# Patient Record
Sex: MALE | Race: OTHER | Employment: FULL TIME | URBAN - METROPOLITAN AREA
[De-identification: names, ages, dates, MRNs, and addresses within clinical notes are randomized per-mention and may not be internally consistent; named-entity substitution may affect disease eponyms.]

---

## 2019-01-07 ENCOUNTER — TELEPHONE (OUTPATIENT)
Dept: FAMILY MEDICINE CLINIC | Age: 44
End: 2019-01-07

## 2019-01-07 ENCOUNTER — OFFICE VISIT (OUTPATIENT)
Dept: FAMILY MEDICINE CLINIC | Age: 44
End: 2019-01-07

## 2019-01-07 VITALS
RESPIRATION RATE: 16 BRPM | SYSTOLIC BLOOD PRESSURE: 129 MMHG | HEART RATE: 73 BPM | HEIGHT: 67 IN | OXYGEN SATURATION: 97 % | DIASTOLIC BLOOD PRESSURE: 82 MMHG | TEMPERATURE: 96.7 F | BODY MASS INDEX: 31.08 KG/M2 | WEIGHT: 198 LBS

## 2019-01-07 DIAGNOSIS — R10.11 RIGHT UPPER QUADRANT ABDOMINAL PAIN: Primary | ICD-10-CM

## 2019-01-07 DIAGNOSIS — K76.0 FATTY LIVER: ICD-10-CM

## 2019-01-07 PROBLEM — K29.30 CHRONIC SUPERFICIAL GASTRITIS WITHOUT BLEEDING: Status: ACTIVE | Noted: 2019-01-07

## 2019-01-07 LAB
BILIRUB UR QL STRIP: NEGATIVE
GLUCOSE UR-MCNC: NEGATIVE MG/DL
KETONES P FAST UR STRIP-MCNC: NEGATIVE MG/DL
PH UR STRIP: 5.5 [PH] (ref 4.6–8)
PROT UR QL STRIP: NEGATIVE
SP GR UR STRIP: 1.02 (ref 1–1.03)
UA UROBILINOGEN AMB POC: NORMAL (ref 0.2–1)
URINALYSIS CLARITY POC: CLEAR
URINALYSIS COLOR POC: YELLOW
URINE BLOOD POC: NORMAL
URINE LEUKOCYTES POC: NEGATIVE
URINE NITRITES POC: NEGATIVE

## 2019-01-07 NOTE — PROGRESS NOTES
Mirtha Mathur is a 39 y.o. male (: 1975) presenting to address:abdominal burning x3 weeks     Chief Complaint   Patient presents with    Abdominal Pain     burning sensation x3 weeks       Vitals:    19 1029   BP: 129/82   Pulse: 73   Resp: 16   Temp: 96.7 °F (35.9 °C)   TempSrc: Oral   SpO2: 97%   Weight: 198 lb (89.8 kg)   Height: 5' 7\" (1.702 m)   PainSc:   5   PainLoc: Abdomen       Hearing/Vision:   No exam data present    Learning Assessment:   No flowsheet data found. Depression Screening:     PHQ over the last two weeks 2019   Little interest or pleasure in doing things Not at all   Feeling down, depressed, irritable, or hopeless Not at all   Total Score PHQ 2 0     Fall Risk Assessment:   No flowsheet data found. Abuse Screening:   No flowsheet data found. Coordination of Care Questionaire:   1. Have you been to the ER, urgent care clinic since your last visit? Hospitalized since your last visit? no    2. Have you seen or consulted any other health care providers outside of the [address] [place] [address] since your last visit? Include any pap smears or colon screening. no    Advanced Directive:   1. Do you have an Advanced Directive? no    2. Would you like information on Advanced Directives? No    Patient declined flu vaccine.

## 2019-01-07 NOTE — PATIENT INSTRUCTIONS
Nonalcoholic Steatohepatitis (MEYER): Care Instructions  Your Care Instructions    Nonalcoholic steatohepatitis (MEYER) is liver inflammation. It is caused by a buildup of fat in the liver. The fat buildup is not caused by drinking alcohol. Because of the inflammation, the liver does not work as well as it should. MEYER is part of a group of liver diseases called nonalcoholic fatty liver disease. In these diseases, fat builds up in the liver and sometimes causes liver damage. This damage can get worse over time. Follow-up care is a key part of your treatment and safety. Be sure to make and go to all appointments, and call your doctor if you are having problems. It's also a good idea to know your test results and keep a list of the medicines you take. How can you care for yourself at home? · Stay at a healthy weight. Or if you need to, slowly get to a healthy weight. · Control your cholesterol. Talk to your doctor about ways to lower your cholesterol, if needed. You might try getting active, taking medicines, and making healthy changes to your diet. · Eat healthy foods. This includes fruits, vegetables, lean meats and dairy, and whole grains. · If you have diabetes, keep your blood sugar at your target level. · Get at least 30 minutes of exercise on most days of the week. Walking is a good choice. You also may want to do other activities, such as running, swimming, cycling, or playing tennis or team sports. · Limit alcohol, or do not drink. Alcohol can damage the liver and cause health problems. When should you call for help? Call 911 anytime you think you may need emergency care.  For example, call if:    · You have trouble breathing.     · You vomit blood or what looks like coffee grounds.    Call your doctor now or seek immediate medical care if:    · You feel very sleepy or confused.     · You have new or worse belly pain.     · You have a fever.     · There is a new or increasing yellow tint to your skin or the whites of your eyes.     · You have any abnormal bleeding, such as:  ? Nosebleeds. ? Vaginal bleeding that is different (heavier, more frequent, at a different time of the month) than what you are used to.  ? Bloody or black stools, or rectal bleeding. ? Bloody or pink urine.    Watch closely for changes in your health, and be sure to contact your doctor if:    · Your belly is getting bigger.     · You are gaining weight.     · You have any problems. Where can you learn more? Go to http://wade-tania.info/. Enter R660 in the search box to learn more about \"Nonalcoholic Steatohepatitis (MEYER): Care Instructions. \"  Current as of: March 28, 2018  Content Version: 11.8  © 9703-3735 [employer]wise, Incorporated. Care instructions adapted under license by [employer] (which disclaims liability or warranty for this information). If you have questions about a medical condition or this instruction, always ask your healthcare professional. [address] any warranty or liability for your use of this information.

## 2019-01-07 NOTE — PROGRESS NOTES
Phil Maldonado is a 39 y.o.  male and presents with    Chief Complaint   Patient presents with    Abdominal Pain     burning sensation x3 weeks         Subjective:  Patient presents for continued RUQ pain ongoing for > 6 months. Was seen by GI in past and was told no identified gall issues. Does have pain rates 5/10 all the time. He states pain is in RUQ only. No n/v/d/c identified. Not worse after eating. Previous ultrasound only showed fatty liver no c/o gallbladder. No Known Allergies    Review of Systems   Gastrointestinal: Positive for abdominal pain. All other systems reviewed and are negative. Objective:  Vitals:    01/07/19 1029   BP: 129/82   Pulse: 73   Resp: 16   Temp: 96.7 °F (35.9 °C)   TempSrc: Oral   SpO2: 97%   Weight: 198 lb (89.8 kg)   Height: 5' 7\" (1.702 m)   PainSc:   5   PainLoc: Abdomen     General:   Well-groomed, well-nourished, in no distress, pleasant, alert, appropriate    Cardiovasc:   No JVD. RRR,  no murmur, rubs or gallops. Pulmonary:    Lungs clear bilaterally, no wheezing, rales or rhonchi. Abdomen:   Abdomen soft, normal bowel sounds. No masses,  rebound/rigidity or CVA tenderness. No hepatosplenomegaly. + TTP over RUQ, + marcus. Assessment/Plan:      1. Right upper quadrant abdominal pain  - AMB POC URINALYSIS DIP STICK AUTO W/O MICRO  - US ABD COMP; Future  - METABOLIC PANEL, BASIC; Future  - HEPATIC FUNCTION PANEL; Future  - LIPID PANEL; Future  - CBC W/O DIFF; Future    2. Fatty liver  - METABOLIC PANEL, BASIC; Future  - HEPATIC FUNCTION PANEL; Future  - LIPID PANEL; Future  - CBC W/O DIFF; Future    Will call patient with lab results and ultrasound results. Discussed diet and alcohol use in detail. Also discussed weight loss. Plan is to see back in 3 months. I have discussed the diagnosis with the patient and the intended plan as seen in the above orders.   The patient has received an after-visit summary and questions were answered concerning future plans. I have discussed medication side effects and warnings with the patient as well. I have reviewed the plan of care with the patient, accepted their input and they are in agreement with the treatment goals. Follow-up Disposition:  Return if symptoms worsen or fail to improve. More than 1/2 of this 30 minute visit was spent in counselling and coordination of care, as described above.     Ana Morton FNP-BC

## 2019-01-10 ENCOUNTER — HOSPITAL ENCOUNTER (OUTPATIENT)
Dept: LAB | Age: 44
Discharge: HOME | End: 2019-01-10
Payer: COMMERCIAL

## 2019-01-10 DIAGNOSIS — K76.0 FATTY LIVER: ICD-10-CM

## 2019-01-10 DIAGNOSIS — R10.11 RIGHT UPPER QUADRANT ABDOMINAL PAIN: ICD-10-CM

## 2019-01-10 LAB
ALBUMIN SERPL-MCNC: 4.3 G/DL (ref 3.4–5)
ALBUMIN/GLOB SERPL: 1.3 {RATIO} (ref 0.8–1.7)
ALP SERPL-CCNC: 68 U/L (ref 45–117)
ALT SERPL-CCNC: 50 U/L (ref 16–61)
ANION GAP SERPL CALC-SCNC: 4 MMOL/L (ref 3–18)
AST SERPL-CCNC: 21 U/L (ref 15–37)
BILIRUB DIRECT SERPL-MCNC: <0.1 MG/DL (ref 0–0.2)
BILIRUB SERPL-MCNC: 0.4 MG/DL (ref 0.2–1)
BUN SERPL-MCNC: 19 MG/DL (ref 7–18)
BUN/CREAT SERPL: 22 (ref 12–20)
CALCIUM SERPL-MCNC: 8.6 MG/DL (ref 8.5–10.1)
CHLORIDE SERPL-SCNC: 103 MMOL/L (ref 100–108)
CHOLEST SERPL-MCNC: 220 MG/DL
CO2 SERPL-SCNC: 32 MMOL/L (ref 21–32)
CREAT SERPL-MCNC: 0.85 MG/DL (ref 0.6–1.3)
ERYTHROCYTE [DISTWIDTH] IN BLOOD BY AUTOMATED COUNT: 13.1 % (ref 11.6–14.5)
GLOBULIN SER CALC-MCNC: 3.2 G/DL (ref 2–4)
GLUCOSE SERPL-MCNC: 104 MG/DL (ref 74–99)
HCT VFR BLD AUTO: 45.5 % (ref 36–48)
HDLC SERPL-MCNC: 55 MG/DL (ref 40–60)
HDLC SERPL: 4 {RATIO} (ref 0–5)
HGB BLD-MCNC: 14.8 G/DL (ref 13–16)
LDLC SERPL CALC-MCNC: 139.2 MG/DL (ref 0–100)
LIPID PROFILE,FLP: ABNORMAL
MCH RBC QN AUTO: 28.5 PG (ref 24–34)
MCHC RBC AUTO-ENTMCNC: 32.5 G/DL (ref 31–37)
MCV RBC AUTO: 87.7 FL (ref 74–97)
PLATELET # BLD AUTO: 296 K/UL (ref 135–420)
PMV BLD AUTO: 10.3 FL (ref 9.2–11.8)
POTASSIUM SERPL-SCNC: 4.2 MMOL/L (ref 3.5–5.5)
PROT SERPL-MCNC: 7.5 G/DL (ref 6.4–8.2)
RBC # BLD AUTO: 5.19 M/UL (ref 4.7–5.5)
SODIUM SERPL-SCNC: 139 MMOL/L (ref 136–145)
TRIGL SERPL-MCNC: 129 MG/DL (ref ?–150)
VLDLC SERPL CALC-MCNC: 25.8 MG/DL
WBC # BLD AUTO: 6.8 K/UL (ref 4.6–13.2)

## 2019-01-10 PROCEDURE — 80076 HEPATIC FUNCTION PANEL: CPT

## 2019-01-10 PROCEDURE — 36415 COLL VENOUS BLD VENIPUNCTURE: CPT

## 2019-01-10 PROCEDURE — 80048 BASIC METABOLIC PNL TOTAL CA: CPT

## 2019-01-10 PROCEDURE — 80061 LIPID PANEL: CPT

## 2019-01-10 PROCEDURE — 85027 COMPLETE CBC AUTOMATED: CPT

## 2019-02-25 ENCOUNTER — TELEPHONE (OUTPATIENT)
Dept: FAMILY MEDICINE CLINIC | Age: 44
End: 2019-02-25

## 2019-02-25 NOTE — TELEPHONE ENCOUNTER
Pt is calling requesting a written result on his labs from 01/10/2019, he has been calling waiting on results and still haven't received them.

## 2019-03-08 ENCOUNTER — TELEPHONE (OUTPATIENT)
Dept: FAMILY MEDICINE CLINIC | Age: 44
End: 2019-03-08

## 2019-03-18 ENCOUNTER — TELEPHONE (OUTPATIENT)
Dept: FAMILY MEDICINE CLINIC | Age: 44
End: 2019-03-18

## 2019-03-18 NOTE — LETTER
3/19/2019 7:45 AM 
 
Mr. Zehra Foss 
[address] 
[address] Results for orders placed or performed during the hospital encounter of 01/10/19 METABOLIC PANEL, BASIC Result Value Ref Range Sodium 139 136 - 145 mmol/L Potassium 4.2 3.5 - 5.5 mmol/L Chloride 103 100 - 108 mmol/L  
 CO2 32 21 - 32 mmol/L Anion gap 4 3.0 - 18 mmol/L Glucose 104 (H) 74 - 99 mg/dL BUN 19 (H) 7.0 - 18 MG/DL Creatinine 0.85 0.6 - 1.3 MG/DL  
 BUN/Creatinine ratio 22 (H) 12 - 20 GFR est AA >60 >60 ml/min/1.73m2 GFR est non-AA >60 >60 ml/min/1.73m2 Calcium 8.6 8.5 - 10.1 MG/DL  
HEPATIC FUNCTION PANEL Result Value Ref Range Protein, total 7.5 6.4 - 8.2 g/dL Albumin 4.3 3.4 - 5.0 g/dL Globulin 3.2 2.0 - 4.0 g/dL A-G Ratio 1.3 0.8 - 1.7 Bilirubin, total 0.4 0.2 - 1.0 MG/DL Bilirubin, direct <0.1 0.0 - 0.2 MG/DL Alk. phosphatase 68 45 - 117 U/L  
 AST (SGOT) 21 15 - 37 U/L  
 ALT (SGPT) 50 16 - 61 U/L  
LIPID PANEL Result Value Ref Range LIPID PROFILE Cholesterol, total 220 (H) <200 MG/DL Triglyceride 129 <150 MG/DL  
 HDL Cholesterol 55 40 - 60 MG/DL  
 LDL, calculated 139.2 (H) 0 - 100 MG/DL VLDL, calculated 25.8 MG/DL  
 CHOL/HDL Ratio 4.0 0 - 5.0    
CBC W/O DIFF Result Value Ref Range WBC 6.8 4.6 - 13.2 K/uL  
 RBC 5.19 4.70 - 5.50 M/uL  
 HGB 14.8 13.0 - 16.0 g/dL HCT 45.5 36.0 - 48.0 % MCV 87.7 74.0 - 97.0 FL  
 MCH 28.5 24.0 - 34.0 PG  
 MCHC 32.5 31.0 - 37.0 g/dL  
 RDW 13.1 11.6 - 14.5 % PLATELET 783 437 - 960 K/uL  MPV 10.3 9.2 - 11.8 FL

## 2019-05-01 ENCOUNTER — OFFICE VISIT (OUTPATIENT)
Dept: FAMILY MEDICINE CLINIC | Age: 44
End: 2019-05-01

## 2019-05-01 VITALS
HEART RATE: 85 BPM | OXYGEN SATURATION: 98 % | RESPIRATION RATE: 20 BRPM | TEMPERATURE: 97.7 F | SYSTOLIC BLOOD PRESSURE: 121 MMHG | WEIGHT: 192 LBS | HEIGHT: 67 IN | DIASTOLIC BLOOD PRESSURE: 87 MMHG | BODY MASS INDEX: 30.13 KG/M2

## 2019-05-01 DIAGNOSIS — K76.9 LESION OF LIVER: Primary | ICD-10-CM

## 2019-05-01 DIAGNOSIS — K76.0 FATTY LIVER: ICD-10-CM

## 2019-05-01 DIAGNOSIS — F32.1 CURRENT MODERATE EPISODE OF MAJOR DEPRESSIVE DISORDER WITHOUT PRIOR EPISODE (HCC): ICD-10-CM

## 2019-05-01 NOTE — PATIENT INSTRUCTIONS
Nonalcoholic Steatohepatitis (MEYER): Care Instructions  Your Care Instructions    Nonalcoholic steatohepatitis (MEYER) is liver inflammation. It is caused by a buildup of fat in the liver. The fat buildup is not caused by drinking alcohol. Because of the inflammation, the liver does not work as well as it should. MEYER is part of a group of liver diseases called nonalcoholic fatty liver disease. In these diseases, fat builds up in the liver and sometimes causes liver damage. This damage can get worse over time. Follow-up care is a key part of your treatment and safety. Be sure to make and go to all appointments, and call your doctor if you are having problems. It's also a good idea to know your test results and keep a list of the medicines you take. How can you care for yourself at home? · Stay at a healthy weight. Or if you need to, slowly get to a healthy weight. · Control your cholesterol. Talk to your doctor about ways to lower your cholesterol, if needed. You might try getting active, taking medicines, and making healthy changes to your diet. · Eat healthy foods. This includes fruits, vegetables, lean meats and dairy, and whole grains. · If you have diabetes, keep your blood sugar at your target level. · Get at least 30 minutes of exercise on most days of the week. Walking is a good choice. You also may want to do other activities, such as running, swimming, cycling, or playing tennis or team sports. · Limit alcohol, or do not drink. Alcohol can damage the liver and cause health problems. When should you call for help? Call 911 anytime you think you may need emergency care.  For example, call if:    · You have trouble breathing.     · You vomit blood or what looks like coffee grounds.    Call your doctor now or seek immediate medical care if:    · You feel very sleepy or confused.     · You have new or worse belly pain.     · You have a fever.     · There is a new or increasing yellow tint to your skin or the whites of your eyes.     · You have any abnormal bleeding, such as:  ? Nosebleeds. ? Vaginal bleeding that is different (heavier, more frequent, at a different time of the month) than what you are used to.  ? Bloody or black stools, or rectal bleeding. ? Bloody or pink urine.    Watch closely for changes in your health, and be sure to contact your doctor if:    · Your belly is getting bigger.     · You are gaining weight.     · You have any problems. Where can you learn more? Go to http://wade-tania.info/. Enter S719 in the search box to learn more about \"Nonalcoholic Steatohepatitis (MEYER): Care Instructions. \"  Current as of: March 27, 2018  Content Version: 11.9  © 2713-7697 [employer]. Care instructions adapted under license by [employer] (which disclaims liability or warranty for this information). If you have questions about a medical condition or this instruction, always ask your healthcare professional. [address] any warranty or liability for your use of this information.

## 2019-05-01 NOTE — PROGRESS NOTES
Moody Omalley is a 38 y.o. male (: 1975) presenting to address:    Chief Complaint   Patient presents with   [facility] Follow Up       Vitals:    19 1353   BP: 121/87   Pulse: 85   Resp: 20   Temp: 97.7 °F (36.5 °C)   TempSrc: Oral   SpO2: 98%   Weight: 192 lb (87.1 kg)   Height: 5' 7\" (1.702 m)   PainSc:   2   PainLoc: Abdomen       Hearing/Vision:   No exam data present    Learning Assessment:   No flowsheet data found. Depression Screening:     3 most recent PHQ Screens 2019   Little interest or pleasure in doing things Several days   Feeling down, depressed, irritable, or hopeless Several days   Total Score PHQ 2 2     Fall Risk Assessment:   No flowsheet data found. Abuse Screening:   No flowsheet data found. Coordination of Care Questionaire:   1. Have you been to the ER, urgent care clinic since your last visit? Hospitalized since your last visit? YES demond    2. Have you seen or consulted any other health care providers outside of the [address] [place] [address] since your last visit? Include any pap smears or colon screening. NO    Advanced Directive:   1. Do you have an Advanced Directive? NO    2. Would you like information on Advanced Directives?  NO

## 2019-05-02 NOTE — PROGRESS NOTES
Terence Laughlin is a 38 y.o.  male and presents with    Chief Complaint   Patient presents with   [facility] Follow Up     Subjective:  Patient presents for hospital follow up as new patient. He states he was seen in ED for abdominal pain, nausea and vomiting x 1 day ago. At that time he was dx with lexis liver, liver lesions, and alcoholic gastroparesis. He was given zofran and told to stop drinking. Today he is feeling improved he did not fill zofran n/v has stopped. Denies abdominal pain today as well. On review of records it does show Ultrasound identified 2 1 cm lesions on liver and recommendation for follow up MRI was made by Radiology. Patient also discussed recent change in finances and increased feelings of stress and depressed mood x 3 months. No Known Allergies    Review of Systems   Constitutional: Negative for chills and fever. Respiratory: Negative for shortness of breath. Cardiovascular: Negative for chest pain. Gastrointestinal: Negative for abdominal pain, constipation, diarrhea, nausea and vomiting. Objective:  Vitals:    05/01/19 1353   BP: 121/87   Pulse: 85   Resp: 20   Temp: 97.7 °F (36.5 °C)   TempSrc: Oral   SpO2: 98%   Weight: 192 lb (87.1 kg)   Height: 5' 7\" (1.702 m)   PainSc:   2   PainLoc: Abdomen     General:   Well-groomed, well-nourished, in no distress, pleasant, alert, appropriate    Cardiovasc:   RRR,  no murmur, rubs or gallops. Pulmonary:    Lungs clear bilaterally, no wheezing, rales or rhonchi. Abdomen:   Abdomen soft, normal bowel sounds. No masses, tenderness,    rebound/rigidity or CVA tenderness. No hepatosplenomegaly. Assessment/Plan:      1. Lesion of liver  - MRI ABD W MRCP W WO CONT; Future    2. Fatty liver  Discussed diet and alcohol use to start. Gvae handout on fatty liver causes and tx with healthy lifestyle. Follow up as needed and when MRI results are available for review. - MRI ABD W MRCP W WO CONT; Future    3.  Depression Reviewed PHQ9 and he declines meds for now but will follow up in 3 months to see how he is doing. Gave suicide hotline, advised ED if unstable and recommended therapy. I have discussed the diagnosis with the patient and the intended plan as seen in the above orders. The patient has received an after-visit summary and questions were answered concerning future plans. I have discussed medication side effects and warnings with the patient as well. I have reviewed the plan of care with the patient, accepted their input and they are in agreement with the treatment goals. Follow-up and Dispositions    · Return in about 3 months (around 8/1/2019). More than 1/2 of this 30 minute visit was spent in counselling and coordination of care, as described above.     Martir ARAIZA-BC

## 2019-05-03 ENCOUNTER — DOCUMENTATION ONLY (OUTPATIENT)
Dept: FAMILY MEDICINE CLINIC | Age: 44
End: 2019-05-03

## 2019-05-03 NOTE — PROGRESS NOTES
Faxed copy of ultrasounds with MRI order, ov notes to [facility]. Asked that they auth MRI and schedule pt.

## 2019-05-15 ENCOUNTER — HOSPITAL ENCOUNTER (OUTPATIENT)
Dept: MRI IMAGING | Age: 44
Discharge: HOME | End: 2019-05-15
Attending: NURSE PRACTITIONER
Payer: COMMERCIAL

## 2019-05-15 VITALS — BODY MASS INDEX: 30.07 KG/M2 | WEIGHT: 192 LBS

## 2019-05-15 DIAGNOSIS — K76.0 FATTY LIVER: ICD-10-CM

## 2019-05-15 DIAGNOSIS — K76.9 LESION OF LIVER: ICD-10-CM

## 2019-05-15 PROCEDURE — A9575 INJ GADOTERATE MEGLUMI 0.1ML: HCPCS | Performed by: NURSE PRACTITIONER

## 2019-05-15 PROCEDURE — 74183 MRI ABD W/O CNTR FLWD CNTR: CPT

## 2019-05-15 PROCEDURE — 74011250636 HC RX REV CODE- 250/636: Performed by: NURSE PRACTITIONER

## 2019-05-15 RX ORDER — GADOTERATE MEGLUMINE 376.9 MG/ML
20 INJECTION INTRAVENOUS
Status: COMPLETED | OUTPATIENT
Start: 2019-05-15 | End: 2019-05-15

## 2019-05-15 RX ADMIN — GADOTERATE MEGLUMINE 20 ML: 376.9 INJECTION INTRAVENOUS at 12:54

## 2019-05-30 NOTE — PROGRESS NOTES
Spoke with patient and gave resulted note  Transferred patient to front office to schedule 6 month follow up.

## 2020-02-14 PROBLEM — K76.0 FATTY LIVER: Status: ACTIVE | Noted: 2020-02-14

## 2021-11-08 ENCOUNTER — TELEPHONE (OUTPATIENT)
Dept: FAMILY MEDICINE CLINIC | Age: 46
End: 2021-11-08

## 2021-11-08 DIAGNOSIS — Z13.21 ENCOUNTER FOR VITAMIN DEFICIENCY SCREENING: ICD-10-CM

## 2021-11-08 DIAGNOSIS — K76.0 FATTY LIVER: Primary | ICD-10-CM

## 2021-11-08 DIAGNOSIS — Z13.29 SCREENING FOR THYROID DISORDER: ICD-10-CM

## 2021-11-08 DIAGNOSIS — K29.30 CHRONIC SUPERFICIAL GASTRITIS WITHOUT BLEEDING: ICD-10-CM

## 2021-11-08 DIAGNOSIS — Z11.59 NEED FOR HEPATITIS C SCREENING TEST: ICD-10-CM

## 2021-11-08 DIAGNOSIS — Z13.1 SCREENING FOR DIABETES MELLITUS: ICD-10-CM

## 2021-11-08 DIAGNOSIS — E78.5 DYSLIPIDEMIA, GOAL LDL BELOW 100: ICD-10-CM

## 2021-11-08 NOTE — TELEPHONE ENCOUNTER
Please place orders for upcoming appointments.      Future Appointments   Date Time Provider Ranjith Green   11/12/2021  8:50 AM [facility] AMB   11/19/2021 11:30 AM Lindsay Cerda DO [facility] AMB

## 2021-11-12 ENCOUNTER — APPOINTMENT (OUTPATIENT)
Dept: FAMILY MEDICINE CLINIC | Age: 46
End: 2021-11-12

## 2021-11-12 DIAGNOSIS — Z13.1 SCREENING FOR DIABETES MELLITUS: ICD-10-CM

## 2021-11-12 DIAGNOSIS — Z13.21 ENCOUNTER FOR VITAMIN DEFICIENCY SCREENING: ICD-10-CM

## 2021-11-12 DIAGNOSIS — K29.30 CHRONIC SUPERFICIAL GASTRITIS WITHOUT BLEEDING: ICD-10-CM

## 2021-11-12 DIAGNOSIS — Z11.59 NEED FOR HEPATITIS C SCREENING TEST: ICD-10-CM

## 2021-11-12 DIAGNOSIS — K76.0 FATTY LIVER: ICD-10-CM

## 2021-11-12 DIAGNOSIS — Z13.29 SCREENING FOR THYROID DISORDER: ICD-10-CM

## 2021-11-12 DIAGNOSIS — E78.5 DYSLIPIDEMIA, GOAL LDL BELOW 100: ICD-10-CM

## 2021-11-13 LAB
25(OH)D3+25(OH)D2 SERPL-MCNC: 22.1 NG/ML (ref 30–100)
ALBUMIN SERPL-MCNC: 4.7 G/DL (ref 4–5)
ALBUMIN/GLOB SERPL: 2.1 {RATIO} (ref 1.2–2.2)
ALP SERPL-CCNC: 58 IU/L (ref 44–121)
ALT SERPL-CCNC: 28 IU/L (ref 0–44)
AST SERPL-CCNC: 24 IU/L (ref 0–40)
BASOPHILS # BLD AUTO: 0.1 X10E3/UL (ref 0–0.2)
BASOPHILS NFR BLD AUTO: 1 %
BILIRUB SERPL-MCNC: 0.3 MG/DL (ref 0–1.2)
BUN SERPL-MCNC: 12 MG/DL (ref 6–24)
BUN/CREAT SERPL: 14 (ref 9–20)
CALCIUM SERPL-MCNC: 9 MG/DL (ref 8.7–10.2)
CHLORIDE SERPL-SCNC: 102 MMOL/L (ref 96–106)
CHOLEST SERPL-MCNC: 202 MG/DL (ref 100–199)
CO2 SERPL-SCNC: 24 MMOL/L (ref 20–29)
CREAT SERPL-MCNC: 0.85 MG/DL (ref 0.76–1.27)
EOSINOPHIL # BLD AUTO: 0.1 X10E3/UL (ref 0–0.4)
EOSINOPHIL NFR BLD AUTO: 1 %
ERYTHROCYTE [DISTWIDTH] IN BLOOD BY AUTOMATED COUNT: 13.1 % (ref 11.6–15.4)
EST. AVERAGE GLUCOSE BLD GHB EST-MCNC: 128 MG/DL
GLOBULIN SER CALC-MCNC: 2.2 G/DL (ref 1.5–4.5)
GLUCOSE SERPL-MCNC: 97 MG/DL (ref 65–99)
HBA1C MFR BLD: 6.1 % (ref 4.8–5.6)
HCT VFR BLD AUTO: 42.9 % (ref 37.5–51)
HCV AB S/CO SERPL IA: <0.1 S/CO RATIO (ref 0–0.9)
HDLC SERPL-MCNC: 54 MG/DL
HGB BLD-MCNC: 14.6 G/DL (ref 13–17.7)
IMM GRANULOCYTES # BLD AUTO: 0 X10E3/UL (ref 0–0.1)
IMM GRANULOCYTES NFR BLD AUTO: 0 %
IMP & REVIEW OF LAB RESULTS: NORMAL
LDLC SERPL CALC-MCNC: 131 MG/DL (ref 0–99)
LYMPHOCYTES # BLD AUTO: 1.8 X10E3/UL (ref 0.7–3.1)
LYMPHOCYTES NFR BLD AUTO: 34 %
MCH RBC QN AUTO: 29.4 PG (ref 26.6–33)
MCHC RBC AUTO-ENTMCNC: 34 G/DL (ref 31.5–35.7)
MCV RBC AUTO: 87 FL (ref 79–97)
MONOCYTES # BLD AUTO: 0.4 X10E3/UL (ref 0.1–0.9)
MONOCYTES NFR BLD AUTO: 7 %
NEUTROPHILS # BLD AUTO: 3 X10E3/UL (ref 1.4–7)
NEUTROPHILS NFR BLD AUTO: 57 %
PLATELET # BLD AUTO: 278 X10E3/UL (ref 150–450)
POTASSIUM SERPL-SCNC: 4 MMOL/L (ref 3.5–5.2)
PROT SERPL-MCNC: 6.9 G/DL (ref 6–8.5)
RBC # BLD AUTO: 4.96 X10E6/UL (ref 4.14–5.8)
SODIUM SERPL-SCNC: 141 MMOL/L (ref 134–144)
T4 FREE SERPL-MCNC: 1.19 NG/DL (ref 0.82–1.77)
TRIGL SERPL-MCNC: 95 MG/DL (ref 0–149)
TSH SERPL DL<=0.005 MIU/L-ACNC: 0.43 UIU/ML (ref 0.45–4.5)
VLDLC SERPL CALC-MCNC: 17 MG/DL (ref 5–40)
WBC # BLD AUTO: 5.3 X10E3/UL (ref 3.4–10.8)

## 2021-11-15 NOTE — PROGRESS NOTES
Will review in detail at follow up 11/19:     Vitamin D low and would offer supplement. LDL slightly elevated, stable from 2yrs ago and ASCVD remains low at 2.1%, optimal 1.5%. TSH slightly decreased but T4 WNL. A1C near diabetes range and could consider metformin, no prior values to compare with.

## 2021-11-19 ENCOUNTER — OFFICE VISIT (OUTPATIENT)
Dept: FAMILY MEDICINE CLINIC | Age: 46
End: 2021-11-19
Payer: COMMERCIAL

## 2021-11-19 VITALS
HEIGHT: 67 IN | WEIGHT: 197.6 LBS | DIASTOLIC BLOOD PRESSURE: 76 MMHG | OXYGEN SATURATION: 99 % | HEART RATE: 89 BPM | RESPIRATION RATE: 16 BRPM | TEMPERATURE: 97.2 F | SYSTOLIC BLOOD PRESSURE: 115 MMHG | BODY MASS INDEX: 31.01 KG/M2

## 2021-11-19 DIAGNOSIS — Z78.9 USES SPANISH AS PRIMARY SPOKEN LANGUAGE: ICD-10-CM

## 2021-11-19 DIAGNOSIS — E55.9 VITAMIN D DEFICIENCY: ICD-10-CM

## 2021-11-19 DIAGNOSIS — R79.89 LOW TSH LEVEL: ICD-10-CM

## 2021-11-19 DIAGNOSIS — E05.90 SUBCLINICAL HYPERTHYROIDISM: ICD-10-CM

## 2021-11-19 DIAGNOSIS — Z23 NEEDS FLU SHOT: ICD-10-CM

## 2021-11-19 DIAGNOSIS — K76.89 LIVER NODULE: ICD-10-CM

## 2021-11-19 DIAGNOSIS — R73.03 PREDIABETES: Primary | ICD-10-CM

## 2021-11-19 DIAGNOSIS — Z76.89 ESTABLISHING CARE WITH NEW DOCTOR, ENCOUNTER FOR: ICD-10-CM

## 2021-11-19 DIAGNOSIS — E78.5 DYSLIPIDEMIA: ICD-10-CM

## 2021-11-19 PROCEDURE — 90686 IIV4 VACC NO PRSV 0.5 ML IM: CPT | Performed by: STUDENT IN AN ORGANIZED HEALTH CARE EDUCATION/TRAINING PROGRAM

## 2021-11-19 PROCEDURE — 99214 OFFICE O/P EST MOD 30 MIN: CPT | Performed by: STUDENT IN AN ORGANIZED HEALTH CARE EDUCATION/TRAINING PROGRAM

## 2021-11-19 PROCEDURE — 90471 IMMUNIZATION ADMIN: CPT | Performed by: STUDENT IN AN ORGANIZED HEALTH CARE EDUCATION/TRAINING PROGRAM

## 2021-11-19 RX ORDER — METFORMIN HYDROCHLORIDE 500 MG/1
500 TABLET ORAL
Qty: 90 TABLET | Refills: 1 | Status: SHIPPED | OUTPATIENT
Start: 2021-11-19

## 2021-11-19 NOTE — PATIENT INSTRUCTIONS
Vaccine Information Statement    Influenza (Flu) Vaccine (Inactivated or Recombinant): What You Need to Know    Many vaccine information statements are available in Korean and other languages. See www.immunize.org/vis. Hojas de información sobre vacunas están disponibles en español y en muchos otros idiomas. Visite www.immunize.org/vis. 1. Why get vaccinated? Influenza vaccine can prevent influenza (flu). Flu is a contagious disease that spreads around the [facility] every year, usually between October and May. Anyone can get the flu, but it is more dangerous for some people. Infants and young children, people 72 years and older, pregnant people, and people with certain health conditions or a weakened immune system are at greatest risk of flu complications. Pneumonia, bronchitis, sinus infections, and ear infections are examples of flu-related complications. If you have a medical condition, such as heart disease, cancer, or diabetes, flu can make it worse. Flu can cause fever and chills, sore throat, muscle aches, fatigue, cough, headache, and runny or stuffy nose. Some people may have vomiting and diarrhea, though this is more common in children than adults. In an average year, thousands of people in the [facility] die from flu, and many more are hospitalized. Flu vaccine prevents millions of illnesses and flu-related visits to the doctor each year. 2. Influenza vaccines     CDC recommends everyone 6 months and older get vaccinated every flu season. Children 6 months through 6years of age may need 2 doses during a single flu season. Everyone else needs only 1 dose each flu season. It takes about 2 weeks for protection to develop after vaccination. There are many flu viruses, and they are always changing. Each year a new flu vaccine is made to protect against the influenza viruses believed to be likely to cause disease in the upcoming flu season.  Even when the vaccine doesnt exactly match these viruses, it may still provide some protection. Influenza vaccine does not cause flu. Influenza vaccine may be given at the same time as other vaccines. 3. Talk with your health care provider    Tell your vaccination provider if the person getting the vaccine:   Has had an allergic reaction after a previous dose of influenza vaccine, or has any severe, life-threatening allergies    Has ever had Guillain-Barré Syndrome (also called GBS)    In some cases, your health care provider may decide to postpone influenza vaccination until a future visit. Influenza vaccine can be administered at any time during pregnancy. People who are or will be pregnant during influenza season should receive inactivated influenza vaccine. People with minor illnesses, such as a cold, may be vaccinated. People who are moderately or severely ill should usually wait until they recover before getting influenza vaccine. Your health care provider can give you more information. 4. Risks of a vaccine reaction     Soreness, redness, and swelling where the shot is given, fever, muscle aches, and headache can happen after influenza vaccination.  There may be a very small increased risk of Guillain-Barré Syndrome (GBS) after inactivated influenza vaccine (the flu shot). Araceli Lambert children who get the flu shot along with pneumococcal vaccine (PCV13) and/or DTaP vaccine at the same time might be slightly more likely to have a seizure caused by fever. Tell your health care provider if a child who is getting flu vaccine has ever had a seizure. People sometimes faint after medical procedures, including vaccination. Tell your provider if you feel dizzy or have vision changes or ringing in the ears. As with any medicine, there is a very remote chance of a vaccine causing a severe allergic reaction, other serious injury, or death. 5. What if there is a serious problem?     An allergic reaction could occur after the vaccinated person leaves the clinic. If you see signs of a severe allergic reaction (hives, swelling of the face and throat, difficulty breathing, a fast heartbeat, dizziness, or weakness), call 9-1-1 and get the person to the nearest hospital.    For other signs that concern you, call your health care provider. Adverse reactions should be reported to the Vaccine Adverse Event Reporting System (VAERS). Your health care provider will usually file this report, or you can do it yourself. Visit the VAERS website at www.vaers. [facility].gov or call 5-214.439.5008. VAERS is only for reporting reactions, and VAERS staff members do not give medical advice. 6. The National Vaccine Injury Compensation Program    The [facility] Vaccine Injury Compensation Program (VICP) is a federal program that was created to compensate people who may have been injured by certain vaccines. Claims regarding alleged injury or death due to vaccination have a time limit for filing, which may be as short as two years. Visit the VICP website at www.[facility].gov/vaccinecompensation or call 1-163.929.4274 to learn about the program and about filing a claim. 7. How can I learn more?  Ask your health care provider.  Call your local or state health department.  Visit the website of the Food and Drug Administration (FDA) for vaccine package inserts and additional information at www.fda.gov/vaccines-blood-biologics/vaccines.  Contact the Centers for Disease Control and Prevention (CDC):  - Call 0-546.336.6671 (0-036-ZJN-INFO) or  - Visit CDCs influenza website at www.cdc.gov/flu. Vaccine Information Statement   Inactivated Influenza Vaccine   8/6/2021  41 LARRY Hein [id number]   Department of Health and Human Services  Centers for Disease Control and Prevention    Office Use Only    Metformina (Por la boca)   Se [place] para tratar la diabetes tipo 2.   Blanca(s) : DM2, Fortamet, Glucophage, Glucophage XR, Glumetza, Riomet   Existen muchas otras marcas de [employer]. Kelsey medicamento no debe ser usado cuando:   Kelsey medicamento no es adecuado para todas las personas. No lo use si ha tenido rafael reacción alérgica a la metformina. Forma de usar kelsey medicamento:   Líquido, Tableta, Tableta de liberación prolongada  · [place] astrid medicamentos christina se le haya indicado. Es probable que sea necesario cambiar trimble dosis varias veces hasta encontrar la que funciona mejor para usted. · Lo más conveniente es win kelsey medicamento con comida o con Almo. · Trague la tableta de liberación prolongada entera. No triture, rompa o mastique. Infórmele a trimble médico si usted tiene dificultad para tragar pastillas enteras. · Mida el líquido oral con Doyle Mends, [place] para uso oral o taza especialmente marcadas para medir medicamentos. · Ariella y siga las instrucciones para el paciente que vienen con el medicamento. Hable con trimble médico o farmacéutico si tiene alguna pregunta. · Si olvida rafael dosis: Si olvida rafael dosis de trimble medicamento, tómelo lo más pronto posible. Si es liss la hora para trimble próxima dosis, espere hasta entonces para win trimble dosis regular. No use medicamento adicional para reponer la dosis olvidada. · Guarde el medicamento en un recipiente cerrado a temperatura ambiente y alejado del calor, la humedad y la ariel directa. Medicamentos y [employer] que debe evitar:   Consulte con trimble médico o farmacéutico antes de usar cualquier medicamento, incluyendo los que compra sin receta médica, las vitaminas y los productos herbales. · Algunos medicamentos pueden afectar la función de la metformina.  Informe a trimble médico si está usando cualquiera de los siguientes:  ¨ Acetazolamida, diclorfenamida, dolutegravir, isoniacida, ácido nicotínico, fenitoína, ranolazina, topiramato, vandetanib, zonisamida  ¨ Las pastillas anticonceptivas  ¨ Medicamentos para la presión arterial  ¨ Diurético  ¨ Medicamentos con fenotiazina  ¨ Medicamentos esteroideos  ¨ El medicamento tiroideo  · No consuma alcohol mientras esté . Precauciones levon el uso de carmen medicamento:   · Informe a trimble médico si usted está embarazada o amamantando, o si tiene enfermedad renal, enfermedad hepática, enfermedad del corazón o de los vasos sanguíneos, insuficiencia cardíaca, problemas con la circulación sanguínea, anemia, trastorno de las glándulas suprarrenales o la glándula pituitaria, deficiencia de vitamina B12 o antecedentes de ataque al corazón. Informe a trimble médico si usted consume alcohol. · Nimisha éste medicamento con exceso puede causar [place], leann grave enfermedad llamada acidosis láctica. · Parte de las tabletas de liberación prolongada  puede salir en astrid evacuaciones. [place] es normal.  · Informe al doctor o dentista que lo trate que esta utilizando Santosh. Es posible que deba dejar de tomarlo antes de someterse a [facility], yogesh X o TAC o algún otro examen médico.  · El médico solicitará exámenes de laboratorio levon las citas de rutina para revisar los efectos de Taz. Asista a todas astrid citas. · Guarde todos los medicamentos fuera del alcance de los niños. Nunca comparta astrid medicamentos con [employer].   Efectos secundarios que pueden presentarse levon el uso de carmen medicamento:   Consulte inmediatamente con el médico si nota cualquiera de estos efectos secundarios:  · Reacción alérgica: Comezón o ronchas, hinchazón del tracey o las mario, hinchazón u hormigueo en la boca o garganta, opresión en el pecho, dificultad para respirar  · Confusión, ritmo cardíaco acelerado, aumento de apetito, temblores  · [place] o escalofríos  · Dolor de BJURHOLM, náusea, vómito, elma musculares o calambres  · Dificultad para respirar, ritmo cardíaco lento, desmayo, mareos  · Cansancio o debilidad inusuales  Consulte con el médico si nota los siguientes efectos secundarios menos graves:   · Diarrea, gases  Consulte con el médico si nota otros efectos secundarios que karl son causados por carmen medicamento. Llame a trimble médico para consultarle [facility]. Usted puede notificar astrid efectos secundarios al FDA al 8-838-EIV-0616. © 2017 [facility] Information is for End User's use only and may not be sold, redistributed or otherwise used for commercial purposes. Esta información es sólo para uso en educación. Trimble intención no es darle un consejo médico sobre enfermedades o tratamientos. Colsulte con trimble Joanette Maci farmacéutico antes de seguir cualquier régimen médico para saber si es seguro y efectivo para usted. Prediabetes: Instrucciones de cuidado  Prediabetes: Care Instructions  Instrucciones de cuidado  La prediabetes es rafael señal de advertencia de que usted está en riesgo de llegar a tener diabetes tipo 2. [place] significa que trimble nivel de azúcar en la heather es más alto de lo que debiera ser. Los alimentos que usted come se convierten en azúcar, la cual trimble cuerpo [place] para obtener energía. Normalmente, un órgano llamado páncreas produce insulina, la cual permite que el azúcar en la heather llegue a las células del cuerpo. Zia cuando el cuerpo no puede utilizar la [employer], el azúcar no entra en las células. En cambio, se queda en [employer]. [place] se conoce christina resistencia a la insulina. La acumulación de azúcar en la heather causa prediabetes. Lo andrew es que hacer cambios en trimble estilo de denisse puede ayudarle a hacer que el azúcar en la heather vuelva a un nivel normal y puede ayudarle a evitar o retrasar la diabetes. La atención de seguimiento es rafael parte clave de trimble tratamiento y seguridad. Asegúrese de hacer y acudir a todas las citas, y llame a trimble médico si está teniendo problemas. También es rafael buena idea saber los resultados de astrid exámenes y mantener rafael lista de los medicamentos que lisa. ¿Cómo puede cuidarse en el hogar? · Vigile trimble peso.  Un peso saludable ayuda al organismo a usar la insulina de la [place]. · Limite la cantidad de calorías, dulces y grasas poco saludables que come. Pregunte a trimble médico si debería consultar a un dietista. Un dietista registrado puede ayudarle a elaborar planes de alimentación que se adapten a trimble estilo de denisse. · [place] por lo menos 30 minutos de ejercicio la mayoría de los días de la [place]. El ejercicio ayuda a controlar el azúcar en trimble heather. También ayuda a mantener un peso saludable. Caminar es rafael buena opción. Es posible que también quiera hacer otras actividades, christina correr, nadar, [employer], o jugar al tenis u otros deportes de equipo. · No fume. Fumar puede empeorar la prediabetes. Si necesita ayuda para dejar de fumar, hable con trimble médico sobre programas y medicamentos para dejar de fumar. Estos pueden aumentar astrid probabilidades de dejar el hábito para siempre. · Si trimble médico le recetó medicamentos, tómelos exactamente christina se lo indicó. Llame a trimble médico si karl estar teniendo problemas con trimble medicamento. Recibirá [employer] medicamentos específicos recetados por trimble médico.  ¿Cuándo debe pedir ayuda? Preste especial atención a los cambios en trimble octavio y asegúrese de comunicarse con trimble médico si:    · Tiene cualquier síntoma de diabetes. Estos síntomas podrían incluir:  ? Tener sed con más frecuencia. ? [employer]. ? Ping[employer]amp. ? [employer]. ? Sentirse muy cansado. ? Tener visión borrosa.     · Tiene rafael herida que no cicatriza.     · Tiene rafael infección que no desaparece.     · Tiene problemas con trimble presión arterial.     · Desea más información sobre la diabetes y cómo evitarla. ¿Dónde puede encontrar más información en inglés? Marielena aErly a http://www.gray.com/  Escriba I222 en la búsqueda para aprender más acerca de \"Prediabetes: Instrucciones de cuidado. \"  Revisado: 31 agosto, 2020               Versión del contenido: 13.0  © 2006-2021 Healthwise, Incorporated.    Cardell Hatchet instrucciones de cuidado fueron adaptadas bajo licencia por [facility] (which disclaims liability or warranty for this information). Si usted tiene [place] afección médica o sobre estas instrucciones, siempre pregunte a trimble profesional de octavio. [facility] niega toda garantía o responsabilidad por trimble uso de esta información.

## 2021-11-19 NOTE — PROGRESS NOTES
Flu Immunization/s administered 11/19/2021 by [place] LPN with patients consent. Patient tolerated procedure well. No reactions noted.

## 2021-11-19 NOTE — PROGRESS NOTES
Note to patient:  The purpose of this note is to communicate optimally with the other physicians / APCs involved in your care. It is written using standard medical terminology. If you have questions regarding the details of the note, please contact my office to schedule an appointment to address your questions. Ben E Maryse   Primary Care Office Visit - Problem-Oriented    : 1975   Naomi Salgado is a 51 y.o. male presenting for  Chief Complaint   Patient presents with   [place].Judith Establish Care    Immunization/Injection     flu            Assessment/Plan:       ICD-10-CM ICD-9-CM   1. Prediabetes  R73.03 790.29   2. Dyslipidemia  E78.5 272.4   3. Low TSH level  R79.89 794.5   4. Uses English as primary spoken language  Z78.9 V40.1   5. Vitamin D deficiency  E55.9 268.9   6. Liver nodule  K76.89 573.8   7. Subclinical hyperthyroidism  E05.90 242.90   8. Needs flu shot  Z23 V04.81   9. Establishing care with new doctor, encounter for  Z76.89 V65.8     #Dyslipidemia - LDL slightly elevated, stable from 2yrs ago and ASCVD remains low at 2.1%, optimal 1.5%. #TSH slightly decreased but T4 WNL. Will monitor    #Prediabetes - A1C near diabetes range and discussed risk/benefit of metformin, no prior values to compare with. Agreeable to initiate Metformin and closely monitor A1C. Key Antihyperglycemic Medications             metFORMIN (GLUCOPHAGE) 500 mg tablet (Taking) Take 1 Tablet by mouth daily (with dinner).         #Vit D deficiency - Reviewed and offered supplement    #Flu shot needed-Flu shot administered in office without adverse reaction     #Liver nodules   Record review of Initial eval 19, follow up MRI abdomen 2/3/20 without change  Several hepatic nodules evident on arterial face, largest up to 1.3cm; likely focal nodular hyperplasia    #HM  Colon CA screening - Hx of colonoscopy (6yrs, polyps)    #Uses English as primary spoken language  [ entire visit conducted in [facility] ]     Orders Placed This Encounter    Influenza Vaccine. QUAD, PF, SYR (Afluria 17315)    AMB POC HEMOGLOBIN A1C     Standing Status:   Future     Standing Expiration Date:   5/19/2023    metFORMIN (GLUCOPHAGE) 500 mg tablet     Sig: Take 1 Tablet by mouth daily (with dinner). Dispense:  90 Tablet     Refill:  1       Follow-up and Dispositions    · Return in about 3 months (around 2/19/2022) for to recheck A1C. This document may have been created with the aid of dictation software. Text may contain errors, particularly phonetic errors. Reviewed management plan & instructions with patient, who voiced understanding. Subjective   History:   Mignon Swan is a 51 y.o. male presenting to address:  Chief Complaint   Patient presents with   [address] [facility] Immunization/Injection     flu     Last PCP visit: 5/1/19 - Marco A Salinas NP    HPI  Since last visit:   Any changes in medication, procedures, hospital visits, or specialist visits? Hernia Repair  Not on chronic medications. Past Medical History:   Diagnosis Date    Fatty liver     Gastritis      Past Surgical History:   Procedure Laterality Date    HX HERNIA REPAIR  06/25/2021      reports that he has been smoking cigarettes. He has been smoking about 0.50 packs per day. He has never used smokeless tobacco. He reports current alcohol use. He reports that he does not use drugs.   Social History     Social History Narrative    Not on file     Social History     Tobacco Use   Smoking Status Current Every Day Smoker    Packs/day: 0.50    Types: Cigarettes   Smokeless Tobacco Never Used     Family History   Problem Relation Age of Onset    No Known Problems Mother     No Known Problems Father      No Known Allergies    Problem List:      Patient Active Problem List    Diagnosis    Fatty liver    Chronic superficial gastritis without bleeding       Medications:     Current Outpatient Medications   Medication Sig    metFORMIN (GLUCOPHAGE) 500 mg tablet Take 1 Tablet by mouth daily (with dinner). No current facility-administered medications for this visit. Review of Systems:     Review of Systems  A comprehensive review of systems was negative except for that written in the HPI       Objective     Physical Assessment:     Visit Vitals  /76 (BP 1 Location: Right arm, BP Patient Position: Sitting, BP Cuff Size: Large adult)   Pulse 89   Temp 97.2 °F (36.2 °C) (Temporal)   Resp 16   Ht 5' 7\" (1.702 m)   Wt 197 lb 9.6 oz (89.6 kg)   SpO2 99%   BMI 30.95 kg/m²       Physical Exam  Vitals and nursing note reviewed. Constitutional:       General: He is not in acute distress. Cardiovascular:      Rate and Rhythm: Normal rate and regular rhythm. Pulses: Normal pulses. Pulmonary:      Effort: Pulmonary effort is normal. No respiratory distress. Musculoskeletal:      Right lower leg: No edema. Left lower leg: No edema. Neurological:      Mental Status: He is alert and oriented to person, place, and time. Gait: Gait normal.   Psychiatric:         Mood and Affect: Mood normal.         Behavior: Behavior normal.         Thought Content:  Thought content normal.         Judgment: Judgment normal.                 Aryan Zheng, DO  Family Medicine  11/19/2021

## 2021-11-19 NOTE — PROGRESS NOTES
Paul Mccracken is a 51 y.o. male (: 1975) presenting to address:    Chief Complaint   Patient presents with   [address] [facility] [address] Immunization/Injection     flu       Vitals:    21 1133   BP: 115/76   Pulse: 89   Resp: 16   Temp: 97.2 °F (36.2 °C)   TempSrc: Temporal   SpO2: 99%   Weight: 197 lb 9.6 oz (89.6 kg)   Height: 5' 7\" (1.702 m)   PainSc:   0 - No pain       Hearing/Vision:   No exam data present    Learning Assessment:   No flowsheet data found. Depression Screening:     3 most recent PHQ Screens 2021   Little interest or pleasure in doing things Not at all   Feeling down, depressed, irritable, or hopeless Not at all   Total Score PHQ 2 0   Trouble falling or staying asleep, or sleeping too much -   Feeling tired or having little energy -   Poor appetite, weight loss, or overeating -   Feeling bad about yourself - or that you are a failure or have let yourself or your family down -   Trouble concentrating on things such as school, work, reading, or watching TV -   Moving or speaking so slowly that other people could have noticed; or the opposite being so fidgety that others notice -   Thoughts of being better off dead, or hurting yourself in some way -   PHQ 9 Score -     Fall Risk Assessment:     Fall Risk Assessment, last 12 mths 2021   Able to walk? Yes   Fall in past 12 months? 0   Do you feel unsteady? 0   Are you worried about falling 0     Abuse Screening:     Abuse Screening Questionnaire 2021   Do you ever feel afraid of your partner? N   Are you in a relationship with someone who physically or mentally threatens you? N   Is it safe for you to go home? Y     ADL Assessment:   No flowsheet data found. Coordination of Care Questionaire:   1. \"Have you been to the ER, urgent care clinic since your last visit? Hospitalized since your last visit? \" No    2.  \"Have you seen or consulted any other health care providers outside of the [address] [place] [address] since your last visit? \" Yes Where: Dr. Ilsa Kohli  Reason for visit: Hernia     3. For patients aged 39-70: Has the patient had a colonoscopy? No     If the patient is female:    4. For patients aged 41-77: Has the patient had a mammogram within the past 2 years? No    5. For patients aged 21-65: Has the patient had a pap smear? No    Advanced Directive:   1. Do you have an Advanced Directive? NO    2. Would you like information on Advanced Directives?  NO

## 2022-02-18 ENCOUNTER — OFFICE VISIT (OUTPATIENT)
Dept: FAMILY MEDICINE CLINIC | Age: 47
End: 2022-02-18
Payer: COMMERCIAL

## 2022-02-18 VITALS
DIASTOLIC BLOOD PRESSURE: 76 MMHG | TEMPERATURE: 98.2 F | WEIGHT: 193 LBS | OXYGEN SATURATION: 96 % | HEIGHT: 67 IN | SYSTOLIC BLOOD PRESSURE: 120 MMHG | HEART RATE: 73 BPM | RESPIRATION RATE: 16 BRPM | BODY MASS INDEX: 30.29 KG/M2

## 2022-02-18 DIAGNOSIS — R73.03 PREDIABETES: Primary | ICD-10-CM

## 2022-02-18 DIAGNOSIS — E55.9 VITAMIN D DEFICIENCY: ICD-10-CM

## 2022-02-18 DIAGNOSIS — E05.90 SUBCLINICAL HYPERTHYROIDISM: ICD-10-CM

## 2022-02-18 DIAGNOSIS — E78.5 DYSLIPIDEMIA: ICD-10-CM

## 2022-02-18 DIAGNOSIS — R10.32 LEFT GROIN PAIN: ICD-10-CM

## 2022-02-18 DIAGNOSIS — Z72.0 TOBACCO USE: ICD-10-CM

## 2022-02-18 DIAGNOSIS — Z78.9 USES SPANISH AS PRIMARY SPOKEN LANGUAGE: ICD-10-CM

## 2022-02-18 DIAGNOSIS — K76.89 LIVER NODULE: ICD-10-CM

## 2022-02-18 LAB — HBA1C MFR BLD HPLC: 5.5 %

## 2022-02-18 PROCEDURE — 83036 HEMOGLOBIN GLYCOSYLATED A1C: CPT | Performed by: STUDENT IN AN ORGANIZED HEALTH CARE EDUCATION/TRAINING PROGRAM

## 2022-02-18 PROCEDURE — 99214 OFFICE O/P EST MOD 30 MIN: CPT | Performed by: STUDENT IN AN ORGANIZED HEALTH CARE EDUCATION/TRAINING PROGRAM

## 2022-02-18 RX ORDER — CYCLOBENZAPRINE HCL 10 MG
10 TABLET ORAL
Qty: 30 TABLET | Refills: 0 | Status: SHIPPED | OUTPATIENT
Start: 2022-02-18 | End: 2022-07-06 | Stop reason: SDUPTHER

## 2022-02-18 RX ORDER — NAPROXEN 500 MG/1
TABLET ORAL
Qty: 30 TABLET | Refills: 0 | Status: SHIPPED | OUTPATIENT
Start: 2022-02-18

## 2022-02-18 NOTE — PROGRESS NOTES
Note to patient:  The purpose of this note is to communicate optimally with the other physicians / APCs involved in your care. It is written using standard medical terminology. If you have questions regarding the details of the note, please contact my office to schedule an appointment to address your questions. [facility]  Primary Care Office Visit - Problem-Oriented    : 1975   Nicho White is a 51 y.o. male presenting for  Chief Complaint   Patient presents with    Medication Evaluation          Assessment/Plan:       ICD-10-CM ICD-9-CM   1. Prediabetes  R73.03 790.29   2. Uses Polish as primary spoken language  Z78.9 V40.1   3. Dyslipidemia  E78.5 272.4   4. Vitamin D deficiency  E55.9 268.9   5. Subclinical hyperthyroidism  E05.90 242.90   6. Liver nodule  K76.89 573.8   7. Tobacco use  Z72.0 305.1   8. Left groin pain  R10.32 789.04     #Dyslipidemia - LDL slightly elevated, stable from 2yrs ago and ASCVD remains low at 2.1%, optimal 1.5%. Lab Results   Component Value Date/Time    LDL, calculated 131 (H) 2021 12:00 AM    LDL, calculated 139.2 (H) 01/10/2019 10:26 AM     #Subclinical Hyperthyroidism - TSH slightly decreased but T4 WNL. Will monitor  Lab Results   Component Value Date/Time    TSH 0.433 (L) 2021 12:00 AM     #Prediabetes - Significant improvement after initiation of Metformin and dietary changes to reduce carbohydrate/sugar intake. POC A1C 5.5 today! Commended on success and will repeat levels in 6mo     Lab Results   Component Value Date/Time    Hemoglobin A1c 6.1 (H) 2021 12:00 AM      #Tobacco use disorder - interested in cessation   5-6 cigarros/day  Reviewed that the best rates of smoking cessation success are found with a combination of behavioral changes, medications, and nicotine replacement therapy. Discussed risk/benefit of treatments available including Chantix and Wellbutrin.   Reviewed safe use of nicotine replacement in long term (patches) and short term forms (gum, lozenge.) Provided additional resources and guidance and encouraged to follow up for assistance if interested. #Vit D deficiency - Reviewed and offered supplement     #Liver nodules   Record review of Initial eval 5/17/19, follow up MRI abdomen 2/3/20 without change  Several hepatic nodules evident on arterial face, largest up to 1.3cm; likely focal nodular hyperplasia     #Persistent intermittent L groin pain - worse with exertion or certain positions   Describes as pressure sensation   Discussed trial of treatment for muscle strain and will evaluate with U/S strotum    #HM  Colon CA screening - Hx of colonoscopy (6yrs, polyps)     #Uses Togolese as primary spoken language  [ entire visit conducted in [place] ]     Orders Placed This Encounter    US SCROTUM/TESTICLES     Standing Status:   Future     Standing Expiration Date:   3/18/2023     Order Specific Question:   Reason for Exam     Answer:   persistent recurrent Left groin pain, worse with strain or increased activity    HEMOGLOBIN A1C WITH EAG     Standing Status:   Future     Standing Expiration Date:   2/18/2023    TSH AND FREE T4     Standing Status:   Future     Standing Expiration Date:   2/19/2023    THYROID ANTIBODY PANEL     Standing Status:   Future     Standing Expiration Date:   2/18/2023    AMB POC HEMOGLOBIN A1C     Last A1C 6.1    cyclobenzaprine (FLEXERIL) 10 mg tablet     Sig: Take 1 Tablet by mouth three (3) times daily as needed for Muscle Spasm(s). Take first dose before bed and if having significant grogginess in the morning, I would limit use to nightly only or try 1/2 tablet. Dispense:  30 Tablet     Refill:  0    naproxen (NAPROSYN) 500 mg tablet     Sig: Take 1 tablet twice daily with food x 1 week. After 1 week, can continue up to twice daily with food as needed for pain.      Dispense:  30 Tablet     Refill:  0     Follow-up and Dispositions    · Return in about 6 months (around 8/18/2022) for after updating labs . Reviewed management plan & instructions with patient, who voiced understanding. Subjective   History:   Fernando Oconnor is a 51 y.o. male presenting to address:  Chief Complaint   Patient presents with    Medication Evaluation     Last PCP visit: 11/19/2021    Since last visit:   Any changes in medication, procedures, hospital visits, or specialist visits? Denies  Tolerating medications without adverse reactions? Reports good compliance with Rx without notable adverse effects. Review of Systems:     A comprehensive review of systems was negative except for that written in the HPI and A/P         Objective     Physical Assessment:     Visit Vitals  /76 (BP 1 Location: Right arm, BP Patient Position: Sitting, BP Cuff Size: Adult)   Pulse 73   Temp 98.2 °F (36.8 °C) (Temporal)   Resp 16   Ht 5' 7\" (1.702 m)   Wt 193 lb (87.5 kg)   SpO2 96%   BMI 30.23 kg/m²     Physical Exam  Vitals and nursing note reviewed. Constitutional:       General: He is not in acute distress. Cardiovascular:      Rate and Rhythm: Normal rate and regular rhythm. Pulses: Normal pulses. Pulmonary:      Effort: Pulmonary effort is normal. No respiratory distress. Neurological:      Mental Status: He is alert and oriented to person, place, and time. Gait: Gait normal.   Psychiatric:         Mood and Affect: Mood normal.         Behavior: Behavior normal.         Thought Content: Thought content normal.         Judgment: Judgment normal.                 This document may have been created with the aid of dictation software. Text may contain errors, particularly phonetic errors.       Chana Latham DO  Family Medicine  02/18/2022

## 2022-02-18 NOTE — PROGRESS NOTES
Sally Ibarra is a 51 y.o. male (: 1975) presenting to address:    Chief Complaint   Patient presents with    Medication Evaluation       Vitals:    22 0927   BP: 120/76   Pulse: 73   Resp: 16   Temp: 98.2 °F (36.8 °C)   TempSrc: Temporal   SpO2: 96%   Weight: 193 lb (87.5 kg)   Height: 5' 7\" (1.702 m)   PainSc:   0 - No pain       Hearing/Vision:   No exam data present    Learning Assessment:   No flowsheet data found. Depression Screening:     3 most recent PHQ Screens 2021   Little interest or pleasure in doing things Not at all   Feeling down, depressed, irritable, or hopeless Not at all   Total Score PHQ 2 0   Trouble falling or staying asleep, or sleeping too much -   Feeling tired or having little energy -   Poor appetite, weight loss, or overeating -   Feeling bad about yourself - or that you are a failure or have let yourself or your family down -   Trouble concentrating on things such as school, work, reading, or watching TV -   Moving or speaking so slowly that other people could have noticed; or the opposite being so fidgety that others notice -   Thoughts of being better off dead, or hurting yourself in some way -   PHQ 9 Score -     Fall Risk Assessment:     Fall Risk Assessment, last 12 mths 2022   Able to walk? Yes   Fall in past 12 months? 0   Do you feel unsteady? 0   Are you worried about falling 0     Abuse Screening:     Abuse Screening Questionnaire 2022   Do you ever feel afraid of your partner? N   Are you in a relationship with someone who physically or mentally threatens you? N   Is it safe for you to go home? Y     ADL Assessment:   No flowsheet data found. Coordination of Care Questionaire:   1. \"Have you been to the ER, urgent care clinic since your last visit? Hospitalized since your last visit? \" No    2. \"Have you seen or consulted any other health care providers outside of the [address] [place] [address] since your last visit? \" No     3.  For patients aged 39-70: Has the patient had a colonoscopy / FIT/ Cologuard? No      If the patient is female:    4. For patients aged 41-77: Has the patient had a mammogram within the past 2 years? NA - based on age or sex  See top three    5. For patients aged 21-65: Has the patient had a pap smear? NA - based on age or sex    Advanced Directive:   1. Do you have an Advanced Directive? NO    2. Would you like information on Advanced Directives?  NO

## 2022-02-18 NOTE — PATIENT INSTRUCTIONS
Tratamiento topical para dolor:     Puede comprar Aspercreme o Volteran gel 1% (también conocido christina Diclofenac) en la farmacia sin receta médica. Es el mismo tipo de medicamento que el ibuprofeno (NSAID) que funciona graham para la inflamación sin los mismos efectos secundarios que vienen con la versión oral del medicamento. Viene en un tubo, generalmente de 100 g y se puede usar según sea necesario para el dolor muscular o articular.  Alentados a utilizar tratamientos no farmacológicos christina descanso, calor/frío, estiramientos y masajes en el área afectada también    Georges Fumando:     Dejar de fumar es rafael de las cosas más importantes que puede hacer por trimble octavio a Kailyn Felisha. Las mejores tasas de éxito para dejar de fumar se encuentran con rafael combinación de cambios de comportamiento, medicamentos y terapia de reemplazo de nicotina. Las intervenciones conductuales incluyen programas para dejar de fumar, apoyo telefónico gratuito para dejar de fumar 1-800-QUIT-NOW, acupuntura y servicios de asesoramiento virtuales o en persona. Puede encontrar información [employer] recursos ofrecidos por cada estado de los [place] en [phone]. La mayoría de los [address] [place] [address] ofrecen muestras gratuitas de medicamentos, generalmente parches, chicles y/o pastillas de nicotina. Ejemplo de programa para dejar de fumar usando rafael combinación de métodos:    1. Caminar diariamente levon al menos 30 minutos. Llueva o truene, siempre puede caminar adentro, leann a la [employer] resulta útil salir a win aire fresco y alejarse de donde pueda existir la tentación. 2. Llevar un diario dos veces al día. Cada mañana tómese un momento para escribir astrid 3 objetivos principales para el día. Por la noche, saque trimble diario y revise astrid metas. Reflexiona sobre cómo fue tu día, qué lograste, qué planeas hacer diferente mañana.  Dedique un cuaderno pequeño para escribir un diario a través de Nilo shepard proceso. 3. Elija rafael fecha para dejar de fumar. Comience el paso 4 dos semanas antes de la fecha elegida para dejar de fumar. Comience el paso 5 en la fecha que eligió para dejar de fumar. 4. Wellbutrin  mg:  Primeros 2 días: tome 1 tableta al día por la mañana      Próximos 80 días: tome 1 tableta por la mañana y 1 tableta por la tarde      Próximos 61 días: lisa 1 pineda por la mañana  5. La terapia de reemplazo de nicotina viene en dos formas: reemplazo de nicotina de acción prolongada (en forma de parches de 7 mg, 14 mg o 21 mg) y reemplazo de nicotina de acción corta (en forma de pastillas o chicles)  Terapia de reemplazo de nicotina con parches de nicotina de 24 horas:  Para <10 cigarrillos al día --> parches de 7 mg levon 64 días    Alentamos a hacer un seguimiento si está interesado en los tratamientos anteriores para ayudar a dejar de fumar. Smoking cessation is one of the most important things you can do for your longterm health. The best rates of smoking cessation success are found with a combination of behavioral changes, medications and nicotine replacement therapy. Behavioral interventions include smoking cessation programs, free telephone quitline support 1-800-QUIT-NOW, acupuncture, and counseling services virtual or in person. Information about resources offered by each state in the [place] can be found at [employer]. Most states also offer free samples of medication, usually nicotine patches, gum, and/or lozenges. Example of tobacco cessation program using combination of methods:    1. Daily walking for at least 30 minutes. Rain or shine, you can always walk indoors, but most people find it helpful to get outside for fresh air and get away from where temptation may exist.   2.  Journaling twice daily. Each morning take a few moments to write out your top 3 goals for the day. In the evening, take out your journal and review your goals.   Reflect on how your day went, what you accomplished, what you plan to do differently tomorrow. Dedicate a small notebook to journaling through this process. 3.  Choose a quit date. Start step 4 two weeks before your chosen quit date. Start step 5 on your chosen quit date. 4. Wellbutrin  mg:   First 2 days - take 1 tablet daily in AM      Next 89 days - take 1 tablet in AM and 1 tablet in PM      Next 61 days - take 1 table in AM   5. Nicotine replacement therapy comes in two forms: Long-acting nicotine replacement (in the form of patches in 7mg, 14mg, or 21mg) and a Short-acting nicotine replacement (in the form of lozenges or gum)  Nicotine Replacement Therapy with 24 hour nicotine patches:  For <10 cigarettes per day --> 7 mg patches for 56 days     Encouraged to follow up if interested in the above treatments to help with tobacco cessation. Distensión en la juan: Instrucciones de cuidado  Groin Strain: Care Instructions  Instrucciones de cuidado  Rafael distensión en la juan es rafael lesión que sucede cuando usted se desgarra o estira en exceso un músculo de la juan. Los músculos de la juan están ubicados a cada lado del cuerpo en los pliegues donde el abdomen se une con las piernas. Usted puede tener rafael distensión en un músculo inguinal levon el ejercicio, christina al correr, patinar, patear jugando al fútbol o al jugar al básquetbol. Puede ocurrir cuando levanta, empuja o adelina objetos pesados. Usted podría tener un tirón en un músculo inguinal al caerse. La lesión puede variar desde un tirón leve hasta un desgarro más grave del músculo. Madalynn Rebel dolor y sensibilidad que es peor al apretar las piernas. También puede tener dolor al levantar la rodilla del lado lesionado. Puede denny hinchazón o moretones en la escobar de la juan o en la earl interna del muslo. Si usted tiene rafael distensión grave, es posible que cojee al caminar [employer].   El reposo y otros cuidados en el hogar pueden ayudar a que sane el músculo. La sanación puede tardar [employer] 3 semanas o más tiempo. Trimble médico puede querer verlo de nuevo al cabo de 2 o 3 semanas. La atención de seguimiento es rafael parte clave de trimble tratamiento y seguridad. Asegúrese de hacer y acudir a todas las citas, y llame a trimble médico si está teniendo problemas. También es rafael buena idea saber los resultados de astrid exámenes y mantener rafael lista de los medicamentos que lisa. ¿Cómo puede cuidarse en el hogar? · Sea shine con los medicamentos. Ariella y siga todas las indicaciones de la [place]. ? Si el médico le recetó un analgésico (medicamento para el dolor), tómelo según las indicaciones. ? Si no está tomando un analgésico recetado, pregúntele a trimble médico si puede win ramirez de [employer]. · Descanse y proteja la escobar lesionada o adolorida de la juan levon 1 o 2 semanas. Suspenda, cambie o tómese un descanso de cualquier actividad que pudiera estar causándole el dolor o las [place]. No realice actividades intensas si todavía tiene dolor. · Aplíquese hielo o rafael compresa fría en la escobar de la juan por entre 10 y 21 minutos cada vez. Trate de hacerlo cada 1 o 2 horas levon los siguientes 3 días (cuando esté despierto) o hasta que baje la hinchazón. Colóquese un paño valdovinos entre el hielo y la piel. · Después de 2 o 3 días, si ya no tiene hinchazón, aplíquese calor. Póngase rafael bolsa de agua tibia, rafael almohadilla térmica ajustada a baja temperatura o un paño tibio sobre la juan. No se vaya a dormir con rafael almohadilla térmica sobre la piel. · Si trimble médico le mariluz muletas, asegúrese de usarlas según las instrucciones. · Lleve puestos ropa interior o pantalones cortos ajustados que proporcionen soporte a la escobar lesionada. ¿Cuándo debe pedir ayuda?    Llame a trimble médico ahora mismo o busque atención médica inmediata si:    · Tiene dolor o hinchazón nuevos o peores en la escobar de la juan.     · La juan o la parte superior del muslo está fría o pálida, o cambia de color.     · Tiene hormigueo, debilidad o entumecimiento en la juan o en la pierna.     · No puede  la pierna.     · No puede soportar peso en la pierna. Preste especial atención a los [facility], y asegúrese de comunicarse con trimble médico si:    · No mejora christina se esperaba. ¿Dónde puede encontrar más información en inglés? Vaya a http://www.gray.com/  Ad [id number] en la búsqueda para aprender más acerca de \"Distensión en la juan: Instrucciones de cuidado. \"  Revisado: 1 julio, 2674               UXEBFND del contenido: 13.0  © 2949-0686 Healthwise, Incorporated. Las instrucciones de cuidado fueron adaptadas bajo licencia por [facility] (which disclaims liability or warranty for this information). Si usted tiene [place] afección médica o sobre estas instrucciones, siempre pregunte a trimble profesional de octavio. Healthwise, Incorporated niega toda garantía o responsabilidad por trimble uso de esta información.

## 2022-03-19 PROBLEM — K29.30 CHRONIC SUPERFICIAL GASTRITIS WITHOUT BLEEDING: Status: ACTIVE | Noted: 2019-01-07

## 2022-03-20 PROBLEM — K76.0 FATTY LIVER: Status: ACTIVE | Noted: 2020-02-14

## 2022-07-06 ENCOUNTER — APPOINTMENT (OUTPATIENT)
Dept: FAMILY MEDICINE CLINIC | Age: 47
End: 2022-07-06

## 2022-07-06 ENCOUNTER — OFFICE VISIT (OUTPATIENT)
Dept: FAMILY MEDICINE CLINIC | Age: 47
End: 2022-07-06
Payer: COMMERCIAL

## 2022-07-06 VITALS
SYSTOLIC BLOOD PRESSURE: 121 MMHG | TEMPERATURE: 98.7 F | HEIGHT: 67 IN | HEART RATE: 73 BPM | OXYGEN SATURATION: 99 % | BODY MASS INDEX: 30.89 KG/M2 | WEIGHT: 196.8 LBS | RESPIRATION RATE: 16 BRPM | DIASTOLIC BLOOD PRESSURE: 75 MMHG

## 2022-07-06 DIAGNOSIS — E55.9 VITAMIN D DEFICIENCY: ICD-10-CM

## 2022-07-06 DIAGNOSIS — Z72.0 TOBACCO USE: ICD-10-CM

## 2022-07-06 DIAGNOSIS — E78.5 DYSLIPIDEMIA: ICD-10-CM

## 2022-07-06 DIAGNOSIS — K76.89 LIVER NODULE: ICD-10-CM

## 2022-07-06 DIAGNOSIS — E05.90 SUBCLINICAL HYPERTHYROIDISM: ICD-10-CM

## 2022-07-06 DIAGNOSIS — F17.200 TOBACCO USE DISORDER: ICD-10-CM

## 2022-07-06 DIAGNOSIS — R73.03 PREDIABETES: ICD-10-CM

## 2022-07-06 DIAGNOSIS — Z00.00 ROUTINE ADULT HEALTH MAINTENANCE: ICD-10-CM

## 2022-07-06 DIAGNOSIS — M54.9 ACUTE BACK PAIN LESS THAN 4 WEEKS DURATION: Primary | ICD-10-CM

## 2022-07-06 DIAGNOSIS — S39.012A LUMBAR STRAIN, INITIAL ENCOUNTER: ICD-10-CM

## 2022-07-06 DIAGNOSIS — M54.9 ACUTE BACK PAIN LESS THAN 4 WEEKS DURATION: ICD-10-CM

## 2022-07-06 DIAGNOSIS — Z78.9 USES SPANISH AS PRIMARY SPOKEN LANGUAGE: ICD-10-CM

## 2022-07-06 LAB
ABSOLUTE LYMPHOCYTE COUNT, 10803: 1.9 K/UL (ref 1–4.8)
BASOPHILS # BLD: 0 K/UL (ref 0–0.2)
BASOPHILS NFR BLD: 1 % (ref 0–2)
EOSINOPHIL # BLD: 0.1 K/UL (ref 0–0.5)
EOSINOPHIL NFR BLD: 1 % (ref 0–6)
ERYTHROCYTE [DISTWIDTH] IN BLOOD BY AUTOMATED COUNT: 13.5 % (ref 10–15.5)
GRANULOCYTES,GRANS: 57 % (ref 40–75)
HCT VFR BLD AUTO: 42.5 % (ref 39.3–51.6)
HGB BLD-MCNC: 14 G/DL (ref 13.1–17.2)
LYMPHOCYTES, LYMLT: 34 % (ref 20–45)
MCH RBC QN AUTO: 29 PG (ref 26–34)
MCHC RBC AUTO-ENTMCNC: 33 G/DL (ref 31–36)
MCV RBC AUTO: 89 FL (ref 80–95)
MONOCYTES # BLD: 0.4 K/UL (ref 0.1–1)
MONOCYTES NFR BLD: 8 % (ref 3–12)
NEUTROPHILS # BLD AUTO: 3.2 K/UL (ref 1.8–7.7)
PLATELET # BLD AUTO: 279 K/UL (ref 140–440)
PMV BLD AUTO: 10.7 FL (ref 9–13)
RBC # BLD AUTO: 4.79 M/UL (ref 3.8–5.8)
WBC # BLD AUTO: 5.7 K/UL (ref 4–11)

## 2022-07-06 PROCEDURE — 99214 OFFICE O/P EST MOD 30 MIN: CPT | Performed by: STUDENT IN AN ORGANIZED HEALTH CARE EDUCATION/TRAINING PROGRAM

## 2022-07-06 RX ORDER — CYCLOBENZAPRINE HCL 10 MG
10 TABLET ORAL
Qty: 30 TABLET | Refills: 0 | Status: SHIPPED | OUTPATIENT
Start: 2022-07-06

## 2022-07-06 NOTE — LETTER
NOTIFICATION RETURN TO WORK / SCHOOL    7/6/2022 10:59 AM    Mr. [address]  [address] [address] [id number]      To Whom It May Concern:    Kristina Parra is currently under the care of [address] [place] [address]. He was seen 7/6/2022 for symptoms that first started 7/5/22 and can return to work without restriction on 7/6/22. If there are questions or concerns please have the patient contact our office.         Sincerely,      Radha Magdaleno, DO

## 2022-07-06 NOTE — PROGRESS NOTES
Johanna Cabrera (: 1975) is a 49 y.o. male, established patient, here for:    ASSESSMENT/PLAN:    ICD-10-CM ICD-9-CM   1. Acute back pain less than 4 weeks duration  M54.9 724.5   2. Uses Turkmen as primary spoken language  Z78.9 V40.1   3. Prediabetes  R73.03 790.29   4. Dyslipidemia  E78.5 272.4   5. Vitamin D deficiency  E55.9 268.9   6. Subclinical hyperthyroidism  E05.90 242.90   7. Tobacco use  Z72.0 305.1   8. Liver nodule  K76.89 573.8   9. Tobacco use disorder  F17.200 305.1   10. Routine adult health maintenance  Z00.00 V70.0   11. Lumbar strain, initial encounter  S39.012A 847.2      #Acute left sided low back pain w/o sciatica x 1 wk, suspect #lumbar strain   Plan for short scheduled course of NSAID and muscle relaxer to reduce overall inflammation with instruction to then continue treatment as needed for pain. (Has been using Naproxen at home BID with food with good effect)  Reviewed safe and effective dosing of OTC analgesics and provided instructions. Provided instructions on stretches to target muscles involved and encouraged to start after a few days of treatment if feeling better and continue after resolution to help prevent recurrence. Encouraged to utilize nonpharmacologic treatments such as rest, heat/cold, stretching, and massage to affected area as well  If limited improvement, would consider Lumbar X ray     #Dyslipidemia - Unclear control, will check levels  Will monitor and encourage to work on heart healthy habits.      Lab Results   Component Value Date/Time    LDL, calculated 131 (H) 2021 12:00 AM    LDL, calculated 139.2 (H) 01/10/2019 10:26 AM     The 10-year ASCVD risk score (Wendy Paez, et al., 2013) is: 5.7%    Values used to calculate the score:      Age: 49 years      Sex: Male      Is Non- : No      Diabetic: No      Tobacco smoker: Yes      Systolic Blood Pressure: 037 mmHg      Is BP treated: No      HDL Cholesterol: 54 mg/dL Total Cholesterol: 202 mg/dL    #Subclinical Hyperthyroidism - TSH slightly decreased but T4 WNL. Unclear control, will check levels  Lab Results   Component Value Date/Time    TSH 0.433 (L) 11/12/2021 12:00 AM     #Prediabetes - Significant improvement after initiation of Metformin and dietary changes to reduce carbohydrate/sugar intake. Last POC A1C 5.5 - Feb 2022; Commended on success and will repeat levels  Lab Results   Component Value Date/Time    Hemoglobin A1c 6.1 (H) 11/12/2021 12:00 AM    Glucose 97 11/12/2021 12:00 AM    LDL, calculated 131 (H) 11/12/2021 12:00 AM    LDL, calculated 139.2 (H) 01/10/2019 10:26 AM    Creatinine 0.85 11/12/2021 12:00 AM     #Tobacco use disorder - interested in cessation   5-6 cigarros/day  Reviewed that the best rates of smoking cessation success are found with a combination of behavioral changes, medications, and nicotine replacement therapy. Discussed risk/benefit of treatments available including Chantix and Wellbutrin.   Reviewed safe use of nicotine replacement in long term (patches) and short term forms (gum, lozenge.) Provided additional resources and guidance and encouraged to follow up for assistance if interested.      #Vit D deficiency - Reviewed and offered supplement     #Liver nodules   Record review of Initial eval 5/17/19, follow up MRI abdomen 2/3/20 without change  Several hepatic nodules evident on arterial face, largest up to 1.3cm; likely focal nodular hyperplasia     #Persistent intermittent L groin pain - worse with exertion or certain positions   Describes as pressure sensation   Discussed trial of treatment for muscle strain and U/S scrotum ordered (not completed)     #HM  Colon CA screening - Hx of colonoscopy (~2016, polyps) but unsure where completed     #Uses Kiswahili as primary spoken language  [ entire visit conducted in [address] [facility] ]     Orders Placed This Encounter    HEMOGLOBIN A1C WITH EAG     Standing Status:   Future     Number of Occurrences:   1     Standing Expiration Date:   7/6/2023    CBC WITH AUTOMATED DIFF     Standing Status:   Future     Number of Occurrences:   1     Standing Expiration Date:   6/8/1186    METABOLIC PANEL, COMPREHENSIVE     Standing Status:   Future     Number of Occurrences:   1     Standing Expiration Date:   7/6/2023    T4, FREE     Standing Status:   Future     Number of Occurrences:   1     Standing Expiration Date:   7/6/2023    LIPID PANEL     Standing Status:   Future     Number of Occurrences:   1     Standing Expiration Date:   7/6/2023    TSH 3RD GENERATION     Standing Status:   Future     Number of Occurrences:   1     Standing Expiration Date:   7/6/2023    VITAMIN D, 25 HYDROXY     Standing Status:   Future     Number of Occurrences:   1     Standing Expiration Date:   7/6/2023    SED RATE (ESR)     Standing Status:   Future     Number of Occurrences:   1     Standing Expiration Date:   7/6/2023    C REACTIVE PROTEIN, QT     Standing Status:   Future     Number of Occurrences:   1     Standing Expiration Date:   7/6/2023    cyclobenzaprine (FLEXERIL) 10 mg tablet     Sig: Take 1 Tablet by mouth three (3) times daily as needed for Muscle Spasm(s). Take first dose before bed and if having significant grogginess in the morning, I would limit use to nightly only or try 1/2 tablet. Dispense:  30 Tablet     Refill:  0     Return in about 6 months (around 1/6/2023) for 30 min follow up, routine care with PCP. SUBJECTIVE/OBJECTIVE:  Last PCP visit: 2/18/2022    Since last visit:   Any changes in health, procedures, hospital visits, or specialist visits? See A/P  Tolerating medications without adverse reactions? Reports good compliance with Rx without notable adverse effects. Current Outpatient Medications   Medication Sig    cyclobenzaprine (FLEXERIL) 10 mg tablet Take 1 Tablet by mouth three (3) times daily as needed for Muscle Spasm(s).  Take first dose before bed and if having significant grogginess in the morning, I would limit use to nightly only or try 1/2 tablet.  naproxen (NAPROSYN) 500 mg tablet Take 1 tablet twice daily with food x 1 week. After 1 week, can continue up to twice daily with food as needed for pain.  metFORMIN (GLUCOPHAGE) 500 mg tablet Take 1 Tablet by mouth daily (with dinner). (Patient not taking: Reported on 7/6/2022)     No current facility-administered medications for this visit. Visit Vitals  /75 (BP 1 Location: Right arm, BP Patient Position: Sitting, BP Cuff Size: Adult)   Pulse 73   Temp 98.7 °F (37.1 °C) (Temporal)   Resp 16   Ht 5' 7\" (1.702 m)   Wt 196 lb 12.8 oz (89.3 kg)   SpO2 99%   BMI 30.82 kg/m²     Physical Exam  Vitals and nursing note reviewed. Constitutional:       General: He is not in acute distress. Cardiovascular:      Rate and Rhythm: Normal rate and regular rhythm. Pulses: Normal pulses. Pulmonary:      Effort: Pulmonary effort is normal. No respiratory distress. Neurological:      Mental Status: He is alert and oriented to person, place, and time. Gait: Gait normal.   Psychiatric:         Mood and Affect: Mood normal.         Behavior: Behavior normal.         Thought Content:  Thought content normal.         Judgment: Judgment normal.           Tremayne Guido DO  Family Medicine  07/06/2022

## 2022-07-06 NOTE — PROGRESS NOTES
Dotty Degroot is a 49 y.o. male (: 1975) presenting to address:    Chief Complaint   Patient presents with    Back Pain     lower left back pain x 1 week        Vitals:    22 1038   BP: 121/75   Pulse: 73   Resp: 16   Temp: 98.7 °F (37.1 °C)   TempSrc: Temporal   SpO2: 99%   Weight: 196 lb 12.8 oz (89.3 kg)   Height: 5' 7\" (1.702 m)   PainSc:   6   PainLoc: Back       Hearing/Vision:   No exam data present    Learning Assessment:   No flowsheet data found. Depression Screening:     3 most recent PHQ Screens 2022   Little interest or pleasure in doing things Not at all   Feeling down, depressed, irritable, or hopeless Not at all   Total Score PHQ 2 0   Trouble falling or staying asleep, or sleeping too much -   Feeling tired or having little energy -   Poor appetite, weight loss, or overeating -   Feeling bad about yourself - or that you are a failure or have let yourself or your family down -   Trouble concentrating on things such as school, work, reading, or watching TV -   Moving or speaking so slowly that other people could have noticed; or the opposite being so fidgety that others notice -   Thoughts of being better off dead, or hurting yourself in some way -   PHQ 9 Score -     Fall Risk Assessment:     Fall Risk Assessment, last 12 mths 2022   Able to walk? Yes   Fall in past 12 months? 0   Do you feel unsteady? 0   Are you worried about falling 0     Abuse Screening:     Abuse Screening Questionnaire 2022   Do you ever feel afraid of your partner? N   Are you in a relationship with someone who physically or mentally threatens you? N   Is it safe for you to go home? Y     ADL Assessment:   No flowsheet data found. Coordination of Care Questionaire:   1. \"Have you been to the ER, urgent care clinic since your last visit? Hospitalized since your last visit? \" No    2.  \"Have you seen or consulted any other health care providers outside of the [address] [place] [address] since your last visit? \" No     3. For patients aged 39-70: Has the patient had a colonoscopy / FIT/ Cologuard? No      If the patient is female:    4. For patients aged 41-77: Has the patient had a mammogram within the past 2 years? NA - based on age or sex  See top three    5. For patients aged 21-65: Has the patient had a pap smear? NA - based on age or sex    Advanced Directive:   1. Do you have an Advanced Directive? NO    2. Would you like information on Advanced Directives?  NO

## 2022-07-07 LAB
25(OH)D3 SERPL-MCNC: 28.8 NG/ML (ref 32–100)
A-G RATIO,AGRAT: 2 RATIO (ref 1.1–2.6)
ALBUMIN SERPL-MCNC: 4.8 G/DL (ref 3.5–5)
ALP SERPL-CCNC: 56 U/L (ref 25–115)
ALT SERPL-CCNC: 25 U/L (ref 5–40)
ANION GAP SERPL CALC-SCNC: 12 MMOL/L (ref 3–15)
AST SERPL W P-5'-P-CCNC: 20 U/L (ref 10–37)
AVG GLU, 10930: 125 MG/DL (ref 91–123)
BILIRUB SERPL-MCNC: 0.4 MG/DL (ref 0.2–1.2)
BUN SERPL-MCNC: 13 MG/DL (ref 6–22)
C-REACTIVE PROTEIN, QT, 006627: <0.5 MG/DL
CALCIUM SERPL-MCNC: 9.4 MG/DL (ref 8.4–10.5)
CHLORIDE SERPL-SCNC: 104 MMOL/L (ref 98–110)
CHOLEST SERPL-MCNC: 202 MG/DL (ref 110–200)
CO2 SERPL-SCNC: 26 MMOL/L (ref 20–32)
CREAT SERPL-MCNC: 0.8 MG/DL (ref 0.5–1.2)
GLOBULIN,GLOB: 2.4 G/DL (ref 2–4)
GLOMERULAR FILTRATION RATE: >60 ML/MIN/1.73 SQ.M.
GLUCOSE SERPL-MCNC: 98 MG/DL (ref 70–99)
HBA1C MFR BLD HPLC: 6 % (ref 4.8–5.6)
HDLC SERPL-MCNC: 3.3 MG/DL (ref 0–5)
HDLC SERPL-MCNC: 62 MG/DL
LDL/HDL RATIO,LDHD: 1.7
LDLC SERPL CALC-MCNC: 106 MG/DL (ref 50–99)
NON-HDL CHOLESTEROL, 011976: 140 MG/DL
POTASSIUM SERPL-SCNC: 4.3 MMOL/L (ref 3.5–5.5)
PROT SERPL-MCNC: 7.2 G/DL (ref 6.4–8.3)
SED RATE (ESR): 3 MM/HR (ref 0–15)
SODIUM SERPL-SCNC: 142 MMOL/L (ref 133–145)
T4 FREE SERPL-MCNC: 1.2 NG/DL (ref 0.9–1.8)
THYROGLOBULIN AB,1830278241: <1 IU/ML (ref 0–0.9)
TPO AB,TMCAB: 9 IU/ML (ref 0–34)
TRIGL SERPL-MCNC: 172 MG/DL (ref 40–149)
TSH SERPL DL<=0.005 MIU/L-ACNC: 1.3 MCU/ML (ref 0.27–4.2)
VLDLC SERPL CALC-MCNC: 34 MG/DL (ref 8–30)

## 2022-07-21 NOTE — PROGRESS NOTES
Left message for patient to return call to inform.
Not active [employer] user, can you call with result? Your kidney, liver, thyroid, and bad cholesterol (LDL) are all normal. Your Vitamin D level was found to be low and although not dangerous, I would recommend supplementation with over the counter Vitamin D 5000-10,000 units daily. Your A1C (sugar average for 3 months) remains in the prediabetes range but is slightly improved from when we last checked.
Patient informed.
Received call back from patients daughter however she was not listed on PHI so unable to give her information. She request that's [employer] code be send to [phone] however unable to verify if this is patients number or not. Called and left message for him to return call to verify.
,DirectAddress_Unknown

## 2023-02-20 NOTE — PROGRESS NOTES
Angela Jeffries (: 1975) is a 49 y.o. male, ESTABLISHED patient, here for FOLLOW UP:    ICD-10-CM    1. Prediabetes  R73.03 AMB POC HEMOGLOBIN A1C     fexofenadine (ALLEGRA) 180 MG tablet     loratadine (CLARITIN) 10 MG tablet     Lipid Panel     TSH + Free T4 Panel     Comprehensive Metabolic Panel     CBC with Auto Differential      2. Hyperlipidemia, unspecified hyperlipidemia type  E78.5 fexofenadine (ALLEGRA) 180 MG tablet     loratadine (CLARITIN) 10 MG tablet     Lipid Panel     TSH + Free T4 Panel     Comprehensive Metabolic Panel     CBC with Auto Differential      3. Fatty liver  K76.0 fexofenadine (ALLEGRA) 180 MG tablet     loratadine (CLARITIN) 10 MG tablet     Lipid Panel     TSH + Free T4 Panel     Comprehensive Metabolic Panel     CBC with Auto Differential      4. Chronic superficial gastritis without bleeding  K29.30 fexofenadine (ALLEGRA) 180 MG tablet     loratadine (CLARITIN) 10 MG tablet     Lipid Panel     TSH + Free T4 Panel     Comprehensive Metabolic Panel     CBC with Auto Differential      5. Routine adult health maintenance  Z00.00 fexofenadine (ALLEGRA) 180 MG tablet     loratadine (CLARITIN) 10 MG tablet     Lipid Panel     TSH + Free T4 Panel     Comprehensive Metabolic Panel     CBC with Auto Differential      6. Screen for colon cancer  Z12.11 External Referral To Gastroenterology      7. Non-seasonal allergic rhinitis, unspecified trigger  J30.89 External Referral To Allergy     azelastine (ASTELIN) 0.1 % nasal spray      8. History of sinus surgery  Z98.890         Assessment   Plan     #Chronic allergies - uncontrolled allergies despite good adherence to maintenance meds and nasal sprays, interested in ENT referral ordered 23 (patient [place]history of [place], records not available)  Reviewed optimal use of OTC agents for symptom control and provided with instructions.      #Dyslipidemia - Goal LDL <100  Unclear control, will check levels  Will monitor and encourage to work on heart healthy habits. Lab Results   Component Value Date    LDLCALC 106 (H) 07/06/2022     The 10-year ASCVD risk score (Kaylan Cotter, et al., 2013) is: 5.7%    Values used to calculate the score:      Age: 49 years      Sex: Male      Is Non- : No      Diabetic: No      Tobacco smoker: Yes      Systolic Blood Pressure: 163 mmHg      Is BP treated: No      HDL Cholesterol: 54 mg/dL      Total Cholesterol: 202 mg/dL     #Subclinical Hyperthyroidism - Unclear control, will check levels  Lab Results   Component Value Date    TSH 1.30 07/06/2022    T4FREE 1.2 07/06/2022     #Prediabetes - Significant improvement after initiation of Metformin and dietary changes to reduce carbohydrate/sugar intake. Hemoglobin A1C, POC   Date Value Ref Range Status   02/21/2023 5.8 % Final     #Tobacco use disorder - interested in cessation   5-6 cigarros/day  Reviewed that the best rates of smoking cessation success are found with a combination of behavioral changes, medications, and nicotine replacement therapy. Discussed risk/benefit of treatments available including Chantix and Wellbutrin. Reviewed safe use of nicotine replacement in long term (patches) and short term forms (gum, lozenge.) Provided additional resources and guidance and encouraged to follow up for assistance if interested.       #Vit D Def - reviewed dx and recommended supplement dosing  Lab Results   Component Value Date/Time    VITD25 28.8 07/06/2022 11:11 AM      #Liver nodules   Record review of Initial eval 5/17/19, follow up MRI abdomen 2/3/20 without change  Several hepatic nodules evident on arterial face, largest up to 1.3cm; likely focal nodular hyperplasia     #Persistent intermittent L groin pain - worse with exertion or certain positions   Describes as pressure sensation   Discussed trial of treatment for muscle strain and U/S scrotum ordered (not completed)       #Colon CA screening - Hx of colonoscopy (~2016, polyps) but unsure where completed, unable to review records. Reviewed screening options and preparation/procedure of Colonoscopy. Agreeable to GI referral for Colonoscopy, ordered 2/21/23      #Uses Marshallese as primary spoken language  [ entire visit conducted in [address] [facility] ]           Orders Placed This Encounter   Procedures    Lipid Panel     Standing Status:   Future     Number of Occurrences:   1     Standing Expiration Date:   2/21/2024    TSH + Free T4 Panel     Standing Status:   Future     Number of Occurrences:   1     Standing Expiration Date:   2/21/2024    Comprehensive Metabolic Panel     Standing Status:   Future     Number of Occurrences:   1     Standing Expiration Date:   2/21/2024    CBC with Auto Differential     Standing Status:   Future     Number of Occurrences:   1     Standing Expiration Date:   2/21/2024    External Referral To Gastroenterology     Referral Priority:   Routine     Referral Type:   Eval and Treat     Referral Reason:   Specialty Services Required     Requested Specialty:   Gastroenterology     Number of Visits Requested:   1    External Referral To Allergy     Referral Priority:   Routine     Referral Type:   Eval and Treat     Referral Reason:   Specialty Services Required     Requested Specialty:   Allergy and Immunology     Number of Visits Requested:   1    AMB POC HEMOGLOBIN A1C     Return in about 6 months (around 8/21/2023) for 30min, A1C.       Subjective   See A/P     Current Outpatient Medications   Medication Instructions    azelastine (ASTELIN) 0.1 % nasal spray 2 sprays, Nasal, 2 TIMES DAILY    fexofenadine (ALLEGRA) 180 mg, Oral, DAILY    loratadine (CLARITIN) 10 mg, Oral, DAILY    metFORMIN (GLUCOPHAGE) 500 mg, Oral, DAILY WITH DINNER      Objective   /81 (Site: Left Upper Arm, Position: Sitting, Cuff Size: Large Adult)   Pulse 71   Temp 98 °F (36.7 °C) (Temporal)   Resp 17   Ht 5' 7\" (1.702 m)   Wt 197 lb 3.2 oz (89.4 kg)   SpO2 98%   BMI 30.89 kg/m²   Physical Exam  Vitals and nursing note reviewed. Constitutional:       General: He is not in acute distress. Cardiovascular:      Rate and Rhythm: Normal rate. Pulmonary:      Effort: Pulmonary effort is normal. No respiratory distress. Neurological:      Mental Status: He is alert and oriented to person, place, and time.       Gait: Gait normal.   Psychiatric:         Mood and Affect: Mood normal.         Behavior: Behavior normal.          Pb Celestin DO  Family Medicine  02/21/2023

## 2023-02-21 ENCOUNTER — OFFICE VISIT (OUTPATIENT)
Dept: FAMILY MEDICINE CLINIC | Facility: CLINIC | Age: 48
End: 2023-02-21
Payer: COMMERCIAL

## 2023-02-21 VITALS
OXYGEN SATURATION: 98 % | RESPIRATION RATE: 17 BRPM | WEIGHT: 197.2 LBS | TEMPERATURE: 98 F | SYSTOLIC BLOOD PRESSURE: 118 MMHG | BODY MASS INDEX: 30.95 KG/M2 | HEIGHT: 67 IN | HEART RATE: 71 BPM | DIASTOLIC BLOOD PRESSURE: 81 MMHG

## 2023-02-21 DIAGNOSIS — R73.03 PREDIABETES: Primary | ICD-10-CM

## 2023-02-21 DIAGNOSIS — K76.0 FATTY LIVER: ICD-10-CM

## 2023-02-21 DIAGNOSIS — J30.89 NON-SEASONAL ALLERGIC RHINITIS, UNSPECIFIED TRIGGER: ICD-10-CM

## 2023-02-21 DIAGNOSIS — E78.5 HYPERLIPIDEMIA, UNSPECIFIED HYPERLIPIDEMIA TYPE: ICD-10-CM

## 2023-02-21 DIAGNOSIS — Z98.890 HISTORY OF SINUS SURGERY: ICD-10-CM

## 2023-02-21 DIAGNOSIS — K29.30 CHRONIC SUPERFICIAL GASTRITIS WITHOUT BLEEDING: ICD-10-CM

## 2023-02-21 DIAGNOSIS — Z00.00 ROUTINE ADULT HEALTH MAINTENANCE: ICD-10-CM

## 2023-02-21 DIAGNOSIS — Z12.11 SCREEN FOR COLON CANCER: ICD-10-CM

## 2023-02-21 LAB — HBA1C MFR BLD: 5.8 %

## 2023-02-21 PROCEDURE — 99214 OFFICE O/P EST MOD 30 MIN: CPT | Performed by: STUDENT IN AN ORGANIZED HEALTH CARE EDUCATION/TRAINING PROGRAM

## 2023-02-21 PROCEDURE — 83036 HEMOGLOBIN GLYCOSYLATED A1C: CPT | Performed by: STUDENT IN AN ORGANIZED HEALTH CARE EDUCATION/TRAINING PROGRAM

## 2023-02-21 RX ORDER — FEXOFENADINE HCL 180 MG/1
180 TABLET ORAL DAILY
Qty: 90 TABLET | Refills: 3 | Status: SHIPPED | OUTPATIENT
Start: 2023-02-21

## 2023-02-21 RX ORDER — LORATADINE 10 MG/1
10 TABLET ORAL DAILY
Qty: 90 TABLET | Refills: 3 | Status: SHIPPED | OUTPATIENT
Start: 2023-02-21

## 2023-02-21 RX ORDER — AZELASTINE 1 MG/ML
2 SPRAY, METERED NASAL 2 TIMES DAILY
Qty: 120 ML | Refills: 1 | Status: SHIPPED | OUTPATIENT
Start: 2023-02-21

## 2023-02-21 ASSESSMENT — PATIENT HEALTH QUESTIONNAIRE - PHQ9
SUM OF ALL RESPONSES TO PHQ QUESTIONS 1-9: 0
2. FEELING DOWN, DEPRESSED OR HOPELESS: 0
SUM OF ALL RESPONSES TO PHQ9 QUESTIONS 1 & 2: 0
1. LITTLE INTEREST OR PLEASURE IN DOING THINGS: 0
SUM OF ALL RESPONSES TO PHQ QUESTIONS 1-9: 0

## 2023-02-21 ASSESSMENT — ANXIETY QUESTIONNAIRES
2. NOT BEING ABLE TO STOP OR CONTROL WORRYING: 0
6. BECOMING EASILY ANNOYED OR IRRITABLE: 0
GAD7 TOTAL SCORE: 0
5. BEING SO RESTLESS THAT IT IS HARD TO SIT STILL: 0
7. FEELING AFRAID AS IF SOMETHING AWFUL MIGHT HAPPEN: 0
1. FEELING NERVOUS, ANXIOUS, OR ON EDGE: 0
IF YOU CHECKED OFF ANY PROBLEMS ON THIS QUESTIONNAIRE, HOW DIFFICULT HAVE THESE PROBLEMS MADE IT FOR YOU TO DO YOUR WORK, TAKE CARE OF THINGS AT HOME, OR GET ALONG WITH OTHER PEOPLE: NOT DIFFICULT AT ALL
4. TROUBLE RELAXING: 0
3. WORRYING TOO MUCH ABOUT DIFFERENT THINGS: 0

## 2023-02-21 NOTE — PATIENT INSTRUCTIONS
To best control allergy symptoms consider a trial of daily use of the following treatments. If you have symptoms multiple times per year or can identify certain allergy triggers (such as change in weather/temperature) you would likely benefit from longer term treatment for allergies. These medications are safe to use regularly and would be worth continuing at least a week or two past resolution of symptoms. 1) Once daily *NON DROWSY antihistamine (ex Claritin, Allegra, Zyrtec)   2) Once to Twice daily use of nasal steroid spray (Ex Flonase or generic Fluticasone.) This medication contains a steroid but has little to no absorption when used nasally and does not carry the same risks as taking an oral steroid would. Most patients prefer the \"Sensimist\" Flonase option instead of the liquid spray displayed below.

## 2023-02-21 NOTE — PROGRESS NOTES
Citlalli Kellogg is a 49 y.o. male (: 1975) presenting to address:    Chief Complaint   Patient presents with    Follow-up    Diabetes       Vitals:    23 1254   BP: 118/81   Pulse: 71   Resp: 17   Temp: 98 °F (36.7 °C)   SpO2: 98%       Coordination of Care Questionaire:   1. \"Have you been to the ER, urgent care clinic since your last visit? Hospitalized since your last visit? \" No    2. \"Have you seen or consulted any other health care providers outside of the [address] [place] [address] since your last visit? \" No     3. For patients aged 39-70: Has the patient had a colonoscopy / FIT/ Cologuard? No      If the patient is female:    4. For patients aged 41-77: Has the patient had a mammogram within the past 2 years? NA - based on age or sex      11. For patients aged 21-65: Has the patient had a pap smear? NA - based on age or sex    Advanced Directive:   1. Do you have an Advanced Directive? No    2. Would you like information on Advanced Directives?  No

## 2023-02-22 LAB
T4 FREE: 1.2 NG/DL (ref 0.9–1.8)
TSH SERPL DL<=0.05 MIU/L-ACNC: 1 MCU/ML (ref 0.27–4.2)